# Patient Record
Sex: MALE | Race: WHITE | Employment: OTHER | URBAN - METROPOLITAN AREA
[De-identification: names, ages, dates, MRNs, and addresses within clinical notes are randomized per-mention and may not be internally consistent; named-entity substitution may affect disease eponyms.]

---

## 2017-06-28 DIAGNOSIS — M79.89 PAIN AND SWELLING OF TOE, RIGHT: ICD-10-CM

## 2017-06-28 DIAGNOSIS — M79.674 PAIN AND SWELLING OF TOE, RIGHT: ICD-10-CM

## 2017-06-28 LAB
ALBUMIN SERPL-MCNC: 4.4 G/DL (ref 3.9–4.9)
ALP BLD-CCNC: 80 U/L (ref 35–104)
ALT SERPL-CCNC: 29 U/L (ref 0–41)
ANION GAP SERPL CALCULATED.3IONS-SCNC: 15 MEQ/L (ref 7–13)
AST SERPL-CCNC: 28 U/L (ref 0–40)
BASOPHILS ABSOLUTE: 0 K/UL (ref 0–0.2)
BASOPHILS RELATIVE PERCENT: 0.6 %
BILIRUB SERPL-MCNC: 1 MG/DL (ref 0–1.2)
BUN BLDV-MCNC: 17 MG/DL (ref 8–23)
C-REACTIVE PROTEIN: 3.8 MG/L (ref 0–5)
CALCIUM SERPL-MCNC: 9.4 MG/DL (ref 8.6–10.2)
CHLORIDE BLD-SCNC: 98 MEQ/L (ref 98–107)
CO2: 23 MEQ/L (ref 22–29)
CREAT SERPL-MCNC: 0.95 MG/DL (ref 0.7–1.2)
EOSINOPHILS ABSOLUTE: 0.1 K/UL (ref 0–0.7)
EOSINOPHILS RELATIVE PERCENT: 1 %
GFR AFRICAN AMERICAN: >60
GFR NON-AFRICAN AMERICAN: >60
GLOBULIN: 2.6 G/DL (ref 2.3–3.5)
GLUCOSE BLD-MCNC: 80 MG/DL (ref 74–109)
HCT VFR BLD CALC: 45.7 % (ref 42–52)
HEMOGLOBIN: 15.4 G/DL (ref 14–18)
LYMPHOCYTES ABSOLUTE: 1.8 K/UL (ref 1–4.8)
LYMPHOCYTES RELATIVE PERCENT: 24.2 %
MCH RBC QN AUTO: 30 PG (ref 27–31.3)
MCHC RBC AUTO-ENTMCNC: 33.6 % (ref 33–37)
MCV RBC AUTO: 89.1 FL (ref 80–100)
MONOCYTES ABSOLUTE: 0.5 K/UL (ref 0.2–0.8)
MONOCYTES RELATIVE PERCENT: 6.6 %
NEUTROPHILS ABSOLUTE: 5.1 K/UL (ref 1.4–6.5)
NEUTROPHILS RELATIVE PERCENT: 67.6 %
PDW BLD-RTO: 13.8 % (ref 11.5–14.5)
PLATELET # BLD: 146 K/UL (ref 130–400)
POTASSIUM SERPL-SCNC: 4.1 MEQ/L (ref 3.5–5.1)
RBC # BLD: 5.13 M/UL (ref 4.7–6.1)
SEDIMENTATION RATE, ERYTHROCYTE: 7 MM (ref 0–20)
SODIUM BLD-SCNC: 136 MEQ/L (ref 132–144)
TOTAL PROTEIN: 7 G/DL (ref 6.4–8.1)
URIC ACID, SERUM: 7.4 MG/DL (ref 3.4–7)
WBC # BLD: 7.6 K/UL (ref 4.8–10.8)

## 2018-03-20 ENCOUNTER — OFFICE VISIT (OUTPATIENT)
Dept: PRIMARY CARE CLINIC | Age: 78
End: 2018-03-20
Payer: MEDICARE

## 2018-03-20 VITALS
RESPIRATION RATE: 18 BRPM | OXYGEN SATURATION: 95 % | WEIGHT: 267 LBS | SYSTOLIC BLOOD PRESSURE: 110 MMHG | DIASTOLIC BLOOD PRESSURE: 74 MMHG | TEMPERATURE: 96.6 F | HEIGHT: 73 IN | HEART RATE: 68 BPM | BODY MASS INDEX: 35.39 KG/M2

## 2018-03-20 DIAGNOSIS — M17.12 PRIMARY OSTEOARTHRITIS OF LEFT KNEE: ICD-10-CM

## 2018-03-20 DIAGNOSIS — Z00.00 ANNUAL PHYSICAL EXAM: Primary | ICD-10-CM

## 2018-03-20 DIAGNOSIS — Z12.11 SCREENING FOR COLON CANCER: ICD-10-CM

## 2018-03-20 DIAGNOSIS — Z00.00 ANNUAL PHYSICAL EXAM: ICD-10-CM

## 2018-03-20 DIAGNOSIS — Z12.5 PROSTATE CANCER SCREENING: ICD-10-CM

## 2018-03-20 DIAGNOSIS — I10 ESSENTIAL HYPERTENSION: ICD-10-CM

## 2018-03-20 DIAGNOSIS — K21.9 GASTROESOPHAGEAL REFLUX DISEASE, ESOPHAGITIS PRESENCE NOT SPECIFIED: ICD-10-CM

## 2018-03-20 DIAGNOSIS — Z23 NEED FOR PNEUMOCOCCAL VACCINATION: ICD-10-CM

## 2018-03-20 LAB
BASOPHILS ABSOLUTE: 0.1 K/UL (ref 0–0.2)
BASOPHILS RELATIVE PERCENT: 1.3 %
EOSINOPHILS ABSOLUTE: 0.2 K/UL (ref 0–0.7)
EOSINOPHILS RELATIVE PERCENT: 3.3 %
HCT VFR BLD CALC: 48.4 % (ref 42–52)
HEMOGLOBIN: 16.2 G/DL (ref 14–18)
LYMPHOCYTES ABSOLUTE: 1.6 K/UL (ref 1–4.8)
LYMPHOCYTES RELATIVE PERCENT: 32 %
MCH RBC QN AUTO: 30 PG (ref 27–31.3)
MCHC RBC AUTO-ENTMCNC: 33.4 % (ref 33–37)
MCV RBC AUTO: 89.7 FL (ref 80–100)
MONOCYTES ABSOLUTE: 0.4 K/UL (ref 0.2–0.8)
MONOCYTES RELATIVE PERCENT: 7.5 %
NEUTROPHILS ABSOLUTE: 2.8 K/UL (ref 1.4–6.5)
NEUTROPHILS RELATIVE PERCENT: 55.9 %
PDW BLD-RTO: 13.6 % (ref 11.5–14.5)
PLATELET # BLD: 137 K/UL (ref 130–400)
PROSTATE SPECIFIC ANTIGEN: 1.41 NG/ML (ref 0–6.22)
RBC # BLD: 5.4 M/UL (ref 4.7–6.1)
WBC # BLD: 5 K/UL (ref 4.8–10.8)

## 2018-03-20 PROCEDURE — G0009 ADMIN PNEUMOCOCCAL VACCINE: HCPCS | Performed by: FAMILY MEDICINE

## 2018-03-20 PROCEDURE — 90670 PCV13 VACCINE IM: CPT | Performed by: FAMILY MEDICINE

## 2018-03-20 PROCEDURE — G0439 PPPS, SUBSEQ VISIT: HCPCS | Performed by: FAMILY MEDICINE

## 2018-03-20 PROCEDURE — G8598 ASA/ANTIPLAT THER USED: HCPCS | Performed by: FAMILY MEDICINE

## 2018-03-20 RX ORDER — METOPROLOL SUCCINATE 25 MG/1
TABLET, EXTENDED RELEASE ORAL
COMMUNITY
Start: 2018-02-22

## 2018-03-20 RX ORDER — RANITIDINE 300 MG/1
300 TABLET ORAL NIGHTLY
Qty: 30 TABLET | Refills: 1 | Status: ON HOLD | OUTPATIENT
Start: 2018-03-20 | End: 2018-04-09

## 2018-03-20 ASSESSMENT — PATIENT HEALTH QUESTIONNAIRE - PHQ9
1. LITTLE INTEREST OR PLEASURE IN DOING THINGS: 0
SUM OF ALL RESPONSES TO PHQ QUESTIONS 1-9: 0
SUM OF ALL RESPONSES TO PHQ9 QUESTIONS 1 & 2: 0
2. FEELING DOWN, DEPRESSED OR HOPELESS: 0

## 2018-03-20 ASSESSMENT — ENCOUNTER SYMPTOMS
APNEA: 0
ABDOMINAL PAIN: 0
EYE DISCHARGE: 0
WHEEZING: 0
DIARRHEA: 0
CONSTIPATION: 0
PHOTOPHOBIA: 0
EYE REDNESS: 0
NAUSEA: 0
FACIAL SWELLING: 0
EYE PAIN: 0
STRIDOR: 0
COLOR CHANGE: 0
CHEST TIGHTNESS: 0
CHOKING: 0

## 2018-03-20 NOTE — PROGRESS NOTES
Subjective:      Patient ID: Diandra Steele is a 78 y.o. male who presents today for:  Chief Complaint   Patient presents with    Annual Exam     Patient is here for yearly physical. States he would like to talk to the Doctor about a few things.  Health Maintenance     Interested in FIT test and prevar 13       HPI     Annual Exam  Patient is here for an annual exam. He has labs done at [facility] in November. He does has some stomach pain due to acid reflux. He has spouts of GERD 3-4 a day and is currently taking Omeprazole with significant relief. He has an EGD in the past with Dr. Smith Milner. He does eat a generally healthy diet and exercising regularly. He denies drinking any ETOH. He is not interested in being on Omeprazole long term. He denies any chest pain, SOB, dizziness, headaches, or leg swelling. He is also concerned with weight gain after going through a grieving process with his brother passing. He feels as if he is depressed. He also has questing regarding getting his knees replaced and receiving a referral for a doctor. He is interested in getting a FIT test today in place of a colonoscopy and receiving and Prevnar 13. Past Medical History:   Diagnosis Date    CAD (coronary artery disease)     DJD (degenerative joint disease) of knee     LEFT KNEE     Hypercholesterolemia     Hypertension      Past Surgical History:   Procedure Laterality Date    CORONARY ANGIOPLASTY WITH STENT PLACEMENT      JOINT REPLACEMENT Bilateral     Knee    UPPER GASTROINTESTINAL ENDOSCOPY  1/29/16    w/bx,dilation      Family History   Problem Relation Age of Onset    COPD Mother     Lung Cancer Father     Other Brother      Unknown cause     Social History     Social History    Marital status:      Spouse name: N/A    Number of children: N/A    Years of education: N/A     Occupational History    Not on file.      Social History Main Topics    Smoking status: Former Smoker     Types: Cigarettes     Quit date: 1/1/1970    Smokeless tobacco: Never Used    Alcohol use No    Drug use: No    Sexual activity: Not on file     Other Topics Concern    Not on file     Social History Narrative    No narrative on file     Allergies:  Patient has no known allergies. Review of Systems   Constitutional: Negative for activity change, appetite change, chills, diaphoresis and fever. HENT: Negative for congestion, ear discharge, ear pain, facial swelling, hearing loss and mouth sores. Eyes: Negative for photophobia, pain, discharge and redness. Respiratory: Negative for apnea, choking, chest tightness, wheezing and stridor. Cardiovascular: Negative for chest pain, palpitations and leg swelling. Gastrointestinal: Negative for abdominal pain, constipation, diarrhea and nausea. Endocrine: Negative for cold intolerance, heat intolerance, polydipsia and polyphagia. Genitourinary: Negative for dysuria and frequency. Musculoskeletal: Negative for gait problem, joint swelling, neck pain and neck stiffness. Skin: Negative for color change, pallor, rash and wound. Allergic/Immunologic: Negative for immunocompromised state. Neurological: Negative for tremors, syncope, facial asymmetry, speech difficulty and headaches. Psychiatric/Behavioral: Negative for confusion and hallucinations. The patient is not nervous/anxious and is not hyperactive. Objective:   /74 (Site: Right Arm, Position: Sitting, Cuff Size: Large Adult)   Pulse 68   Temp 96.6 °F (35.9 °C) (Tympanic)   Resp 18   Ht 6' 1\" (1.854 m)   Wt 267 lb (121.1 kg)   SpO2 95%   BMI 35.23 kg/m²     Physical Exam   Constitutional: He is oriented to person, place, and time. He appears well-developed and well-nourished. HENT:   Head: Normocephalic and atraumatic. Eyes: Conjunctivae and EOM are normal. Pupils are equal, round, and reactive to light. Neck: Normal range of motion. Neck supple. No JVD present.  No

## 2018-03-21 ENCOUNTER — TELEPHONE (OUTPATIENT)
Dept: PRIMARY CARE CLINIC | Age: 78
End: 2018-03-21

## 2018-03-21 DIAGNOSIS — R19.5 POSITIVE FIT (FECAL IMMUNOCHEMICAL TEST): Primary | ICD-10-CM

## 2018-03-21 DIAGNOSIS — Z12.11 SCREENING FOR COLON CANCER: ICD-10-CM

## 2018-03-21 LAB
CONTROL: ABNORMAL
HEMOCCULT STL QL: ABNORMAL

## 2018-03-21 PROCEDURE — G0328 FECAL BLOOD SCRN IMMUNOASSAY: HCPCS | Performed by: FAMILY MEDICINE

## 2018-03-29 ENCOUNTER — TELEPHONE (OUTPATIENT)
Dept: ENDOSCOPY | Age: 78
End: 2018-03-29

## 2018-04-09 ENCOUNTER — HOSPITAL ENCOUNTER (OUTPATIENT)
Age: 78
Setting detail: OUTPATIENT SURGERY
Discharge: HOME | End: 2018-04-09
Attending: SPECIALIST | Admitting: SPECIALIST
Payer: MEDICARE

## 2018-04-09 ENCOUNTER — ANESTHESIA (OUTPATIENT)
Dept: ENDOSCOPY | Age: 78
End: 2018-04-09
Payer: MEDICARE

## 2018-04-09 ENCOUNTER — ANESTHESIA EVENT (OUTPATIENT)
Dept: ENDOSCOPY | Age: 78
End: 2018-04-09
Payer: MEDICARE

## 2018-04-09 VITALS
OXYGEN SATURATION: 96 % | SYSTOLIC BLOOD PRESSURE: 99 MMHG | DIASTOLIC BLOOD PRESSURE: 58 MMHG | RESPIRATION RATE: 15 BRPM

## 2018-04-09 VITALS
RESPIRATION RATE: 16 BRPM | TEMPERATURE: 97 F | HEART RATE: 55 BPM | SYSTOLIC BLOOD PRESSURE: 124 MMHG | WEIGHT: 260 LBS | DIASTOLIC BLOOD PRESSURE: 80 MMHG | OXYGEN SATURATION: 95 % | HEIGHT: 73 IN | BODY MASS INDEX: 34.46 KG/M2

## 2018-04-09 PROCEDURE — 7100000011 HC PHASE II RECOVERY - ADDTL 15 MIN: Performed by: SPECIALIST

## 2018-04-09 PROCEDURE — 7100000010 HC PHASE II RECOVERY - FIRST 15 MIN: Performed by: SPECIALIST

## 2018-04-09 PROCEDURE — 2500000003 HC RX 250 WO HCPCS: Performed by: SPECIALIST

## 2018-04-09 PROCEDURE — 6360000002 HC RX W HCPCS: Performed by: NURSE ANESTHETIST, CERTIFIED REGISTERED

## 2018-04-09 PROCEDURE — 3700000000 HC ANESTHESIA ATTENDED CARE: Performed by: SPECIALIST

## 2018-04-09 PROCEDURE — 3609027000 HC COLONOSCOPY: Performed by: SPECIALIST

## 2018-04-09 PROCEDURE — 3700000001 HC ADD 15 MINUTES (ANESTHESIA): Performed by: SPECIALIST

## 2018-04-09 RX ORDER — LIDOCAINE HYDROCHLORIDE 10 MG/ML
1 INJECTION, SOLUTION EPIDURAL; INFILTRATION; INTRACAUDAL; PERINEURAL
Status: COMPLETED | OUTPATIENT
Start: 2018-04-09 | End: 2018-04-09

## 2018-04-09 RX ORDER — SODIUM CHLORIDE 0.9 % (FLUSH) 0.9 %
10 SYRINGE (ML) INJECTION PRN
Status: DISCONTINUED | OUTPATIENT
Start: 2018-04-09 | End: 2018-04-09 | Stop reason: HOSPADM

## 2018-04-09 RX ORDER — SODIUM CHLORIDE 0.9 % (FLUSH) 0.9 %
10 SYRINGE (ML) INJECTION EVERY 12 HOURS SCHEDULED
Status: DISCONTINUED | OUTPATIENT
Start: 2018-04-09 | End: 2018-04-09 | Stop reason: HOSPADM

## 2018-04-09 RX ORDER — ONDANSETRON 2 MG/ML
4 INJECTION INTRAMUSCULAR; INTRAVENOUS
Status: DISCONTINUED | OUTPATIENT
Start: 2018-04-09 | End: 2018-04-09 | Stop reason: HOSPADM

## 2018-04-09 RX ORDER — PROPOFOL 10 MG/ML
INJECTION, EMULSION INTRAVENOUS PRN
Status: DISCONTINUED | OUTPATIENT
Start: 2018-04-09 | End: 2018-04-09 | Stop reason: SDUPTHER

## 2018-04-09 RX ORDER — SODIUM CHLORIDE 9 MG/ML
INJECTION, SOLUTION INTRAVENOUS CONTINUOUS
Status: DISCONTINUED | OUTPATIENT
Start: 2018-04-09 | End: 2018-04-09 | Stop reason: HOSPADM

## 2018-04-09 RX ADMIN — PROPOFOL 40 MG: 10 INJECTION, EMULSION INTRAVENOUS at 10:15

## 2018-04-09 RX ADMIN — LIDOCAINE HYDROCHLORIDE 30 MG: 10 INJECTION, SOLUTION EPIDURAL; INFILTRATION; INTRACAUDAL; PERINEURAL at 10:09

## 2018-04-09 RX ADMIN — PROPOFOL 200 MG: 10 INJECTION, EMULSION INTRAVENOUS at 10:09

## 2018-12-17 ENCOUNTER — TELEPHONE (OUTPATIENT)
Dept: PRIMARY CARE CLINIC | Age: 78
End: 2018-12-17

## 2018-12-17 NOTE — TELEPHONE ENCOUNTER
Madhavi Nunez from [facility] said they are unable to reach patient to set up home care.

## 2019-01-03 ENCOUNTER — OFFICE VISIT (OUTPATIENT)
Dept: PRIMARY CARE CLINIC | Age: 79
End: 2019-01-03
Payer: MEDICARE

## 2019-01-03 VITALS
TEMPERATURE: 97.4 F | DIASTOLIC BLOOD PRESSURE: 80 MMHG | OXYGEN SATURATION: 97 % | SYSTOLIC BLOOD PRESSURE: 122 MMHG | RESPIRATION RATE: 16 BRPM | WEIGHT: 251 LBS | HEART RATE: 72 BPM | BODY MASS INDEX: 33.27 KG/M2 | HEIGHT: 73 IN

## 2019-01-03 DIAGNOSIS — Z96.653 S/P BILATERAL UNICOMPARTMENTAL KNEE REPLACEMENT: ICD-10-CM

## 2019-01-03 DIAGNOSIS — I10 HTN (HYPERTENSION), BENIGN: Primary | ICD-10-CM

## 2019-01-03 DIAGNOSIS — R53.83 FATIGUE, UNSPECIFIED TYPE: ICD-10-CM

## 2019-01-03 DIAGNOSIS — Z09 POSTOP CHECK: ICD-10-CM

## 2019-01-03 DIAGNOSIS — D50.9 IRON DEFICIENCY ANEMIA, UNSPECIFIED IRON DEFICIENCY ANEMIA TYPE: ICD-10-CM

## 2019-01-03 LAB — HGB, POC: 12.5

## 2019-01-03 PROCEDURE — 85018 HEMOGLOBIN: CPT | Performed by: FAMILY MEDICINE

## 2019-01-03 PROCEDURE — G8427 DOCREV CUR MEDS BY ELIG CLIN: HCPCS | Performed by: FAMILY MEDICINE

## 2019-01-03 PROCEDURE — G8482 FLU IMMUNIZE ORDER/ADMIN: HCPCS | Performed by: FAMILY MEDICINE

## 2019-01-03 PROCEDURE — 99213 OFFICE O/P EST LOW 20 MIN: CPT | Performed by: FAMILY MEDICINE

## 2019-01-03 PROCEDURE — 1101F PT FALLS ASSESS-DOCD LE1/YR: CPT | Performed by: FAMILY MEDICINE

## 2019-01-03 PROCEDURE — 4040F PNEUMOC VAC/ADMIN/RCVD: CPT | Performed by: FAMILY MEDICINE

## 2019-01-03 PROCEDURE — 1123F ACP DISCUSS/DSCN MKR DOCD: CPT | Performed by: FAMILY MEDICINE

## 2019-01-03 PROCEDURE — G8598 ASA/ANTIPLAT THER USED: HCPCS | Performed by: FAMILY MEDICINE

## 2019-01-03 PROCEDURE — G8417 CALC BMI ABV UP PARAM F/U: HCPCS | Performed by: FAMILY MEDICINE

## 2019-01-03 PROCEDURE — 1036F TOBACCO NON-USER: CPT | Performed by: FAMILY MEDICINE

## 2019-01-03 ASSESSMENT — ENCOUNTER SYMPTOMS
APNEA: 0
CHEST TIGHTNESS: 0
SHORTNESS OF BREATH: 0
EYES NEGATIVE: 1
COUGH: 0
DIARRHEA: 0
RESPIRATORY NEGATIVE: 1
NAUSEA: 0
ABDOMINAL PAIN: 0
CONSTIPATION: 0
GASTROINTESTINAL NEGATIVE: 1
PHOTOPHOBIA: 0
VOMITING: 0
BACK PAIN: 0
BLOOD IN STOOL: 0
EYE DISCHARGE: 0

## 2019-01-23 ENCOUNTER — TELEPHONE (OUTPATIENT)
Dept: PRIMARY CARE CLINIC | Age: 79
End: 2019-01-23

## 2019-02-25 ENCOUNTER — TELEPHONE (OUTPATIENT)
Dept: PRIMARY CARE CLINIC | Age: 79
End: 2019-02-25

## 2019-02-27 ENCOUNTER — TELEPHONE (OUTPATIENT)
Dept: ENDOCRINOLOGY | Age: 79
End: 2019-02-27

## 2022-01-24 LAB
ABO: NORMAL
ANION GAP SERPL CALCULATED.3IONS-SCNC: 13 MMOL/L (ref 10–20)
ANTIBODY SCREEN: NORMAL
BICARBONATE: 27 MMOL/L (ref 21–32)
CALCIUM SERPL-MCNC: 9.5 MG/DL (ref 8.6–10.3)
CHLORIDE BLD-SCNC: 104 MMOL/L (ref 98–107)
CREAT SERPL-MCNC: 1.22 MG/DL (ref 0.5–1.3)
EGFR MALE: 60 ML/MIN/1.73M2
ERYTHROCYTE [DISTWIDTH] IN BLOOD BY AUTOMATED COUNT: 13.3 % (ref 11.5–14)
GLUCOSE: 122 MG/DL (ref 74–99)
HCT VFR BLD CALC: 48.7 % (ref 41–52)
HEMOGLOBIN: 15.7 G/DL (ref 13.5–17)
MCHC RBC AUTO-ENTMCNC: 32.2 G/DL (ref 32–36)
MCV RBC AUTO: 89 FL (ref 80–100)
NUCLEATED RBCS: 0 /100 WBC (ref 0–0)
PLATELET # BLD: 177 X10E9/L (ref 150–450)
POTASSIUM SERPL-SCNC: 4.6 MMOL/L (ref 3.5–5.3)
RBC # BLD: 5.46 X10E12/L (ref 4.5–5.9)
RH TYPE: NORMAL
SODIUM BLD-SCNC: 139 MMOL/L (ref 136–145)
UREA NITROGEN: 20 MG/DL (ref 6–23)
WBC: 6.5 X10E9/L (ref 4.4–11.3)

## 2022-01-31 LAB
SARS-COV-2, PCR: NOT DETECTED
SPECIMEN SOURCE: NORMAL

## 2022-02-03 LAB
ANION GAP SERPL CALCULATED.3IONS-SCNC: 13 MMOL/L (ref 10–20)
BASOPHILS # BLD: 0.02 X10E9/L (ref 0–0.1)
BASOPHILS RELATIVE PERCENT: 0.2 % (ref 0–2)
BICARBONATE: 23 MMOL/L (ref 21–32)
CALCIUM SERPL-MCNC: 8.3 MG/DL (ref 8.6–10.3)
CHLORIDE BLD-SCNC: 103 MMOL/L (ref 98–107)
CREAT SERPL-MCNC: 1.25 MG/DL (ref 0.5–1.3)
EGFR MALE: 58 ML/MIN/1.73M2
EOSINOPHIL # BLD: 0 X10E9/L (ref 0–0.4)
EOSINOPHILS RELATIVE PERCENT: 0 % (ref 0–6)
ERYTHROCYTE [DISTWIDTH] IN BLOOD BY AUTOMATED COUNT: 13.2 % (ref 11.5–14)
GLUCOSE: 146 MG/DL (ref 74–99)
HCT VFR BLD CALC: 36 % (ref 41–52)
HEMOGLOBIN: 11.5 G/DL (ref 13.5–17)
IMMATURE GRANULOCYTES %: 0.5 % (ref 0–0.9)
LYMPHOCYTES # BLD: 9.9 % (ref 13–44)
LYMPHOCYTES RELATIVE PERCENT: 1.13 X10E9/L (ref 0.8–3)
MCHC RBC AUTO-ENTMCNC: 31.9 G/DL (ref 32–36)
MCV RBC AUTO: 89 FL (ref 80–100)
MONOCYTES # BLD: 0.93 X10E9/L (ref 0.05–0.8)
MONOCYTES RELATIVE PERCENT: 8.2 % (ref 2–10)
NEUTROPHILS RELATIVE PERCENT: 81.2 % (ref 40–80)
NEUTROPHILS: 9.25 X10E9/L (ref 1.6–5.5)
NUCLEATED RBCS: 0 /100 WBC (ref 0–0)
PLATELET # BLD: 154 X10E9/L (ref 150–450)
POTASSIUM SERPL-SCNC: 4.5 MMOL/L (ref 3.5–5.3)
RBC # BLD: 4.06 X10E12/L (ref 4.5–5.9)
SODIUM BLD-SCNC: 134 MMOL/L (ref 136–145)
UREA NITROGEN: 24 MG/DL (ref 6–23)
WBC: 11.4 X10E9/L (ref 4.4–11.3)

## 2022-02-04 LAB
ANION GAP SERPL CALCULATED.3IONS-SCNC: 9 MMOL/L (ref 10–20)
BASOPHILS # BLD: 0.02 X10E9/L (ref 0–0.1)
BASOPHILS RELATIVE PERCENT: 0.3 % (ref 0–2)
BICARBONATE: 26 MMOL/L (ref 21–32)
CALCIUM SERPL-MCNC: 8.6 MG/DL (ref 8.6–10.3)
CHLORIDE BLD-SCNC: 108 MMOL/L (ref 98–107)
CREAT SERPL-MCNC: 1.12 MG/DL (ref 0.5–1.3)
EGFR MALE: 67 ML/MIN/1.73M2
EOSINOPHIL # BLD: 0.08 X10E9/L (ref 0–0.4)
EOSINOPHILS RELATIVE PERCENT: 1 % (ref 0–6)
ERYTHROCYTE [DISTWIDTH] IN BLOOD BY AUTOMATED COUNT: 13.4 % (ref 11.5–14)
GLUCOSE: 101 MG/DL (ref 74–99)
HCT VFR BLD CALC: 34.5 % (ref 41–52)
HEMOGLOBIN: 11.3 G/DL (ref 13.5–17)
IMMATURE GRANULOCYTES %: 0.5 % (ref 0–0.9)
LYMPHOCYTES # BLD: 25 % (ref 13–44)
LYMPHOCYTES RELATIVE PERCENT: 1.97 X10E9/L (ref 0.8–3)
MCHC RBC AUTO-ENTMCNC: 32.8 G/DL (ref 32–36)
MCV RBC AUTO: 90 FL (ref 80–100)
MONOCYTES # BLD: 0.76 X10E9/L (ref 0.05–0.8)
MONOCYTES RELATIVE PERCENT: 9.7 % (ref 2–10)
NEUTROPHILS RELATIVE PERCENT: 63.5 % (ref 40–80)
NEUTROPHILS: 5 X10E9/L (ref 1.6–5.5)
NUCLEATED RBCS: 0 /100 WBC (ref 0–0)
PLATELET # BLD: 133 X10E9/L (ref 150–450)
POTASSIUM SERPL-SCNC: 4.4 MMOL/L (ref 3.5–5.3)
RBC # BLD: 3.82 X10E12/L (ref 4.5–5.9)
SODIUM BLD-SCNC: 139 MMOL/L (ref 136–145)
UREA NITROGEN: 23 MG/DL (ref 6–23)
WBC: 7.9 X10E9/L (ref 4.4–11.3)

## 2023-04-19 LAB
ALANINE AMINOTRANSFERASE (SGPT) (U/L) IN SER/PLAS: 22 U/L (ref 10–52)
ALBUMIN (G/DL) IN SER/PLAS: 4.1 G/DL (ref 3.4–5)
ALKALINE PHOSPHATASE (U/L) IN SER/PLAS: 78 U/L (ref 33–136)
ANION GAP IN SER/PLAS: 13 MMOL/L (ref 10–20)
ASPARTATE AMINOTRANSFERASE (SGOT) (U/L) IN SER/PLAS: 23 U/L (ref 9–39)
BASOPHILS (10*3/UL) IN BLOOD BY AUTOMATED COUNT: 0.06 X10E9/L (ref 0–0.1)
BASOPHILS/100 LEUKOCYTES IN BLOOD BY AUTOMATED COUNT: 1.1 % (ref 0–2)
BILIRUBIN TOTAL (MG/DL) IN SER/PLAS: 1 MG/DL (ref 0–1.2)
CALCIUM (MG/DL) IN SER/PLAS: 9.7 MG/DL (ref 8.6–10.3)
CARBON DIOXIDE, TOTAL (MMOL/L) IN SER/PLAS: 24 MMOL/L (ref 21–32)
CHLORIDE (MMOL/L) IN SER/PLAS: 109 MMOL/L (ref 98–107)
CHOLESTEROL (MG/DL) IN SER/PLAS: 125 MG/DL (ref 0–199)
CHOLESTEROL IN HDL (MG/DL) IN SER/PLAS: 30.9 MG/DL
CHOLESTEROL/HDL RATIO: 4
CREATININE (MG/DL) IN SER/PLAS: 1.31 MG/DL (ref 0.5–1.3)
EOSINOPHILS (10*3/UL) IN BLOOD BY AUTOMATED COUNT: 0.16 X10E9/L (ref 0–0.4)
EOSINOPHILS/100 LEUKOCYTES IN BLOOD BY AUTOMATED COUNT: 3 % (ref 0–6)
ERYTHROCYTE DISTRIBUTION WIDTH (RATIO) BY AUTOMATED COUNT: 13.2 % (ref 11.5–14.5)
ERYTHROCYTE MEAN CORPUSCULAR HEMOGLOBIN CONCENTRATION (G/DL) BY AUTOMATED: 33.4 G/DL (ref 32–36)
ERYTHROCYTE MEAN CORPUSCULAR VOLUME (FL) BY AUTOMATED COUNT: 88 FL (ref 80–100)
ERYTHROCYTES (10*6/UL) IN BLOOD BY AUTOMATED COUNT: 5.05 X10E12/L (ref 4.5–5.9)
GFR MALE: 55 ML/MIN/1.73M2
GLUCOSE (MG/DL) IN SER/PLAS: 124 MG/DL (ref 74–99)
HEMATOCRIT (%) IN BLOOD BY AUTOMATED COUNT: 44.6 % (ref 41–52)
HEMOGLOBIN (G/DL) IN BLOOD: 14.9 G/DL (ref 13.5–17.5)
IMMATURE GRANULOCYTES/100 LEUKOCYTES IN BLOOD BY AUTOMATED COUNT: 0.2 % (ref 0–0.9)
LDL: 65 MG/DL (ref 0–99)
LEUKOCYTES (10*3/UL) IN BLOOD BY AUTOMATED COUNT: 5.4 X10E9/L (ref 4.4–11.3)
LYMPHOCYTES (10*3/UL) IN BLOOD BY AUTOMATED COUNT: 1.86 X10E9/L (ref 0.8–3)
LYMPHOCYTES/100 LEUKOCYTES IN BLOOD BY AUTOMATED COUNT: 34.8 % (ref 13–44)
MONOCYTES (10*3/UL) IN BLOOD BY AUTOMATED COUNT: 0.39 X10E9/L (ref 0.05–0.8)
MONOCYTES/100 LEUKOCYTES IN BLOOD BY AUTOMATED COUNT: 7.3 % (ref 2–10)
NATRIURETIC PEPTIDE B (PG/ML) IN SER/PLAS: 126 PG/ML (ref 0–99)
NEUTROPHILS (10*3/UL) IN BLOOD BY AUTOMATED COUNT: 2.87 X10E9/L (ref 1.6–5.5)
NEUTROPHILS/100 LEUKOCYTES IN BLOOD BY AUTOMATED COUNT: 53.6 % (ref 40–80)
PLATELETS (10*3/UL) IN BLOOD AUTOMATED COUNT: 167 X10E9/L (ref 150–450)
POTASSIUM (MMOL/L) IN SER/PLAS: 4.5 MMOL/L (ref 3.5–5.3)
PROTEIN TOTAL: 6.7 G/DL (ref 6.4–8.2)
SODIUM (MMOL/L) IN SER/PLAS: 141 MMOL/L (ref 136–145)
TRIGLYCERIDE (MG/DL) IN SER/PLAS: 146 MG/DL (ref 0–149)
UREA NITROGEN (MG/DL) IN SER/PLAS: 23 MG/DL (ref 6–23)
VLDL: 29 MG/DL (ref 0–40)

## 2023-12-11 ENCOUNTER — CLINICAL SUPPORT (OUTPATIENT)
Dept: PRIMARY CARE | Facility: CLINIC | Age: 83
End: 2023-12-11
Payer: MEDICARE

## 2023-12-11 DIAGNOSIS — R05.9 COUGH, UNSPECIFIED TYPE: Primary | ICD-10-CM

## 2023-12-11 PROCEDURE — 87636 SARSCOV2 & INF A&B AMP PRB: CPT

## 2023-12-11 PROCEDURE — 87634 RSV DNA/RNA AMP PROBE: CPT

## 2023-12-12 LAB
FLUAV RNA RESP QL NAA+PROBE: NOT DETECTED
FLUBV RNA RESP QL NAA+PROBE: NOT DETECTED
RSV RNA RESP QL NAA+PROBE: DETECTED
SARS-COV-2 RNA RESP QL NAA+PROBE: NOT DETECTED

## 2024-01-18 ENCOUNTER — OFFICE VISIT (OUTPATIENT)
Dept: CARDIOLOGY | Facility: CLINIC | Age: 84
End: 2024-01-18
Payer: MEDICARE

## 2024-01-18 VITALS
WEIGHT: 267 LBS | DIASTOLIC BLOOD PRESSURE: 80 MMHG | SYSTOLIC BLOOD PRESSURE: 118 MMHG | TEMPERATURE: 97.1 F | HEART RATE: 65 BPM | BODY MASS INDEX: 35.39 KG/M2 | HEIGHT: 73 IN

## 2024-01-18 DIAGNOSIS — I10 ESSENTIAL HYPERTENSION, BENIGN: ICD-10-CM

## 2024-01-18 DIAGNOSIS — I25.10 ATHEROSCLEROSIS OF NATIVE CORONARY ARTERY OF NATIVE HEART WITHOUT ANGINA PECTORIS: Primary | ICD-10-CM

## 2024-01-18 DIAGNOSIS — Z87.891 FORMER SMOKER: ICD-10-CM

## 2024-01-18 DIAGNOSIS — E78.00 HYPERCHOLESTEROLEMIA: ICD-10-CM

## 2024-01-18 DIAGNOSIS — I45.10 RIGHT BUNDLE BRANCH BLOCK: ICD-10-CM

## 2024-01-18 DIAGNOSIS — I25.5 ISCHEMIC CARDIOMYOPATHY: ICD-10-CM

## 2024-01-18 PROBLEM — E78.5 HYPERLIPIDEMIA: Status: ACTIVE | Noted: 2018-11-26

## 2024-01-18 PROBLEM — E11.9 DIABETES MELLITUS (MULTI): Status: ACTIVE | Noted: 2024-01-18

## 2024-01-18 PROCEDURE — 93000 ELECTROCARDIOGRAM COMPLETE: CPT | Performed by: INTERNAL MEDICINE

## 2024-01-18 PROCEDURE — 1159F MED LIST DOCD IN RCRD: CPT | Performed by: INTERNAL MEDICINE

## 2024-01-18 PROCEDURE — 3074F SYST BP LT 130 MM HG: CPT | Performed by: INTERNAL MEDICINE

## 2024-01-18 PROCEDURE — 1036F TOBACCO NON-USER: CPT | Performed by: INTERNAL MEDICINE

## 2024-01-18 PROCEDURE — 3079F DIAST BP 80-89 MM HG: CPT | Performed by: INTERNAL MEDICINE

## 2024-01-18 PROCEDURE — 99214 OFFICE O/P EST MOD 30 MIN: CPT | Performed by: INTERNAL MEDICINE

## 2024-01-18 RX ORDER — RAMIPRIL 2.5 MG/1
2.5 CAPSULE ORAL DAILY
COMMUNITY
Start: 2021-11-08 | End: 2024-04-16

## 2024-01-18 RX ORDER — CLOPIDOGREL BISULFATE 75 MG/1
TABLET ORAL
COMMUNITY
Start: 2020-08-10 | End: 2024-05-29 | Stop reason: SDUPTHER

## 2024-01-18 RX ORDER — ASPIRIN 81 MG/1
81 TABLET ORAL DAILY
COMMUNITY

## 2024-01-18 RX ORDER — ATORVASTATIN CALCIUM 80 MG/1
80 TABLET, FILM COATED ORAL DAILY
COMMUNITY
Start: 2016-02-10

## 2024-01-18 RX ORDER — METOPROLOL SUCCINATE 25 MG/1
25 TABLET, EXTENDED RELEASE ORAL DAILY
COMMUNITY
Start: 2018-02-22

## 2024-01-18 RX ORDER — NITROGLYCERIN 0.4 MG/1
0.4 TABLET SUBLINGUAL EVERY 5 MIN PRN
COMMUNITY
Start: 2016-02-10

## 2024-01-18 ASSESSMENT — ENCOUNTER SYMPTOMS
EYES NEGATIVE: 1
PSYCHIATRIC NEGATIVE: 1
DYSURIA: 0
HEMATOLOGIC/LYMPHATIC NEGATIVE: 1
ARTHRALGIAS: 1
RESPIRATORY NEGATIVE: 1
CARDIOVASCULAR NEGATIVE: 1
NEUROLOGICAL NEGATIVE: 1
CONSTITUTIONAL NEGATIVE: 1
GASTROINTESTINAL NEGATIVE: 1
HEMATURIA: 0
DIFFICULTY URINATING: 1

## 2024-01-18 ASSESSMENT — PATIENT HEALTH QUESTIONNAIRE - PHQ9
1. LITTLE INTEREST OR PLEASURE IN DOING THINGS: NOT AT ALL
2. FEELING DOWN, DEPRESSED OR HOPELESS: NOT AT ALL
SUM OF ALL RESPONSES TO PHQ9 QUESTIONS 1 AND 2: 0

## 2024-01-18 NOTE — PROGRESS NOTES
Patient:  Michael Light  YOB: 1940  MRN: [id number]       Chief Complaint/Active Symptoms:       Michael Light is a 83 y.o. male who returns today for cardiac follow-up.    Patient doing well. Wife  fell off a picnic bench and had fractured pelvis then developed GI bleed requiring intervention. No chest pain or discomfort, no shortness of breath. No palpitations, dizziness or lightheadedness.  Remains active and has no specific complaints other than some arthritic ones.    Wife has been ill and finally recovering at home. Has been a stressor for the patient.     Review of Systems   Constitutional: Negative.    HENT:  Positive for hearing loss.    Eyes: Negative.    Respiratory: Negative.     Cardiovascular: Negative.    Gastrointestinal: Negative.    Genitourinary:  Positive for difficulty urinating. Negative for dysuria and hematuria.   Musculoskeletal:  Positive for arthralgias.   Neurological: Negative.    Hematological: Negative.    Psychiatric/Behavioral: Negative.     All other systems reviewed and are negative.      Objective:     Vitals:    01/18/24 0931   BP: 118/80   Pulse: 65   Temp: 36.2 °C (97.1 °F)       Vitals:    01/18/24 0931   Weight: 121 kg (267 lb)       Allergies:     No Known Allergies       Medications:     Current Outpatient Medications   Medication Instructions    aspirin 81 mg, oral, Daily    atorvastatin (LIPITOR) 80 mg, oral, Daily    clopidogrel (Plavix) 75 mg tablet     fish oil concentrate (Omega-3) 120-180 mg capsule oral    metoprolol succinate XL (TOPROL-XL) 25 mg, oral, Daily    nitroglycerin (NITROSTAT) 0.4 mg, sublingual, Every 5 min PRN    ramipril (ALTACE) 2.5 mg, oral, Daily       Physical Examination:   GENERAL:  Well developed, well nourished, in no acute distress.  HEENT: NC AT, EOMI with anicteric sclera  NECK:  Supple, no JVD, no bruit.  CHEST:  Symmetric and nontender.  LUNGS:  Clear to auscultation bilaterally, normal respiratory effort.  HEART:   "PMI nonpalpable, RR, normal S1 and S2, no S3. Soft EVE RUSB, trace edema.   ABDOMEN: Soft, NT, ND without palpable organomegaly or bruits  EXTREMITIES:  Warm with good color, no clubbing or cyanosis.  There is no edema noted.  PERIPHERAL VASCULAR:  Pulses present and equally palpable;  MUSCULOSKELETAL: OA changes  NEURO/PSYCH:  Alert and oriented times three with approppriate behavior and responses. Nonfocal motor examination with normal gait and ambulation  Lymph: No significant palpable lymphadenopathy  Skin: no rash or lesions on exposed skin or reported.    Lab:     CBC:   Lab Results   Component Value Date    WBC 5.4 04/19/2023    RBC 5.05 04/19/2023    HGB 14.9 04/19/2023    HCT 44.6 04/19/2023     04/19/2023        CMP:    Lab Results   Component Value Date     04/19/2023    K 4.5 04/19/2023     (H) 04/19/2023    CO2 24 04/19/2023    BUN 23 04/19/2023    CREATININE 1.31 (H) 04/19/2023    GLUCOSE 124 (H) 04/19/2023    CALCIUM 9.7 04/19/2023       Magnesium:    Lab Results   Component Value Date    MG 2.00 08/11/2021       Lipid Profile:    Lab Results   Component Value Date    TRIG 146 04/19/2023    HDL 30.9 (A) 04/19/2023       TSH:    Lab Results   Component Value Date    TSH 1.29 08/11/2021       BNP:   Lab Results   Component Value Date     (H) 04/19/2023        PT/INR:    No results found for: \"PROTIME\", \"INR\"    HgBA1c:    Lab Results   Component Value Date    HGBA1C 6.1 08/11/2021       BMP:  Lab Results   Component Value Date     04/19/2023     02/04/2022     (L) 02/03/2022    K 4.5 04/19/2023    K 4.4 02/04/2022    K 4.5 02/03/2022     (H) 04/19/2023     (H) 02/04/2022     02/03/2022    CO2 24 04/19/2023    CO2 26 02/04/2022    CO2 23 02/03/2022    BUN 23 04/19/2023    BUN 23 02/04/2022    BUN 24 (H) 02/03/2022    CREATININE 1.31 (H) 04/19/2023    CREATININE 1.12 02/04/2022    CREATININE 1.25 02/03/2022       Cardiac Enzymes:    No results " "found for: \"TROPHS\"    Hepatic Function Panel:    Lab Results   Component Value Date    ALKPHOS 78 04/19/2023    ALT 22 04/19/2023    AST 23 04/19/2023    PROT 6.7 04/19/2023    BILITOT 1.0 04/19/2023    BILIDIR 0.2 10/13/2022         Diagnostic Studies:     No echocardiogram results found for the past 12 months  Echocardiogram September 2021 showed an EF of 55% and mild left ventricular hypertrophy with impaired relaxation.  No significant valvular abnormalities  No nuclear medicine results found for the past 12 months  Nuclear stress test in August 2020 showed a moderate inferior myocardial infarction without ischemia and EF of 51%.  No valid procedures specified.    EKG:   No results found for: \"EKG\"  EKG SR with first degree av block, RBBB, criteria for inferolateral MI.  Radiology:     No orders to display         ASSESSMENT     Problem List Items Addressed This Visit       Atherosclerosis of native coronary artery of native heart without angina pectoris - Primary    Relevant Medications    clopidogrel (Plavix) 75 mg tablet    metoprolol succinate XL (Toprol-XL) 25 mg 24 hr tablet    nitroglycerin (Nitrostat) 0.4 mg SL tablet    Other Relevant Orders    Comprehensive Metabolic Panel    CBC    Lipid Panel    Essential hypertension, benign    Relevant Orders    ECG 12 lead (Clinic Performed) (Completed)    Comprehensive Metabolic Panel    Hypercholesterolemia    Relevant Orders    Comprehensive Metabolic Panel    Lipid Panel    Ischemic cardiomyopathy    Relevant Medications    clopidogrel (Plavix) 75 mg tablet    metoprolol succinate XL (Toprol-XL) 25 mg 24 hr tablet    nitroglycerin (Nitrostat) 0.4 mg SL tablet    Other Relevant Orders    Comprehensive Metabolic Panel    Right bundle branch block    Former smoker    BMI 35.0-35.9,adult       PLAN     1.  Coronary artery disease with history of prior myocardial infarction stable without angina pectoris.  Continue conservative medical therapy and risk factor " modification.  2.  Benign essential hypertension.  Blood pressures are well-controlled on his current medical regimen.  3.  Ischemic cardiomyopathy.  Patient has a history of mild LV dysfunction but most recent evaluation shows low normal EF.  No clinical signs of heart failure and would follow with good blood pressure control.  He is already on both ACE inhibitor and beta-blockers.  4.  Hypercholesterolemia.  Cholesterol is under being checked regularly last shows his LDL cholesterol was at goal.  He will continue his high intensity statin therapy.  5.  Right bundle branch block.  Finding on his EKG.  6.  Tobacco and weight status.  The patient is a former smoker who remains tobacco free is moderately obese we have encouraged a heart healthy Mediterranean diet.    As long as the patient continues to do well we will see him in follow-up in 6 to 9 months sooner if there is any questions or concerns.

## 2024-02-08 ENCOUNTER — APPOINTMENT (OUTPATIENT)
Dept: PRIMARY CARE | Facility: CLINIC | Age: 84
End: 2024-02-08
Payer: MEDICARE

## 2024-02-19 ENCOUNTER — OFFICE VISIT (OUTPATIENT)
Dept: PRIMARY CARE | Facility: CLINIC | Age: 84
End: 2024-02-19
Payer: MEDICARE

## 2024-02-19 VITALS
HEART RATE: 65 BPM | DIASTOLIC BLOOD PRESSURE: 60 MMHG | TEMPERATURE: 97.2 F | BODY MASS INDEX: 35.52 KG/M2 | SYSTOLIC BLOOD PRESSURE: 104 MMHG | RESPIRATION RATE: 16 BRPM | OXYGEN SATURATION: 94 % | WEIGHT: 268 LBS | HEIGHT: 73 IN

## 2024-02-19 DIAGNOSIS — E11.9 TYPE 2 DIABETES MELLITUS WITHOUT COMPLICATION, WITHOUT LONG-TERM CURRENT USE OF INSULIN (MULTI): ICD-10-CM

## 2024-02-19 DIAGNOSIS — K21.9 GASTROESOPHAGEAL REFLUX DISEASE WITHOUT ESOPHAGITIS: ICD-10-CM

## 2024-02-19 DIAGNOSIS — E66.01 MORBID OBESITY (MULTI): ICD-10-CM

## 2024-02-19 DIAGNOSIS — Z12.5 PROSTATE CANCER SCREENING: ICD-10-CM

## 2024-02-19 DIAGNOSIS — Z00.00 MEDICARE ANNUAL WELLNESS VISIT, SUBSEQUENT: Primary | ICD-10-CM

## 2024-02-19 DIAGNOSIS — N18.32 STAGE 3B CHRONIC KIDNEY DISEASE (MULTI): ICD-10-CM

## 2024-02-19 DIAGNOSIS — E78.5 HYPERLIPIDEMIA, UNSPECIFIED HYPERLIPIDEMIA TYPE: ICD-10-CM

## 2024-02-19 DIAGNOSIS — M25.50 ARTHRALGIA, UNSPECIFIED JOINT: ICD-10-CM

## 2024-02-19 DIAGNOSIS — I25.10 ATHEROSCLEROSIS OF NATIVE CORONARY ARTERY OF NATIVE HEART WITHOUT ANGINA PECTORIS: ICD-10-CM

## 2024-02-19 DIAGNOSIS — B35.6 JOCK ITCH: ICD-10-CM

## 2024-02-19 DIAGNOSIS — I10 ESSENTIAL HYPERTENSION, BENIGN: ICD-10-CM

## 2024-02-19 DIAGNOSIS — I25.5 ISCHEMIC CARDIOMYOPATHY: ICD-10-CM

## 2024-02-19 PROCEDURE — 99497 ADVNCD CARE PLAN 30 MIN: CPT | Performed by: FAMILY MEDICINE

## 2024-02-19 PROCEDURE — G0439 PPPS, SUBSEQ VISIT: HCPCS | Performed by: FAMILY MEDICINE

## 2024-02-19 RX ORDER — OMEPRAZOLE 20 MG/1
20 TABLET, DELAYED RELEASE ORAL DAILY
Qty: 90 TABLET | Refills: 1 | COMMUNITY
Start: 2024-02-19 | End: 2024-08-17

## 2024-02-19 RX ORDER — CLOTRIMAZOLE AND BETAMETHASONE DIPROPIONATE 10; .64 MG/G; MG/G
1 CREAM TOPICAL 2 TIMES DAILY
Qty: 100 G | Refills: 1 | Status: SHIPPED | OUTPATIENT
Start: 2024-02-19 | End: 2024-04-19

## 2024-02-19 ASSESSMENT — ENCOUNTER SYMPTOMS
MYALGIAS: 0
EYE PAIN: 0
RECTAL PAIN: 0
APPETITE CHANGE: 0
SLEEP DISTURBANCE: 0
DIARRHEA: 0
ACTIVITY CHANGE: 0
HEADACHES: 0
CHEST TIGHTNESS: 0
STRIDOR: 0
ARTHRALGIAS: 0
FATIGUE: 0
CONSTITUTIONAL NEGATIVE: 1
HEMATURIA: 0
POLYDIPSIA: 0
SHORTNESS OF BREATH: 0
PALPITATIONS: 0
DEPRESSION: 0
OCCASIONAL FEELINGS OF UNSTEADINESS: 0
NERVOUS/ANXIOUS: 0
NECK STIFFNESS: 0
SPEECH DIFFICULTY: 0
COLOR CHANGE: 0
BLOOD IN STOOL: 0
CONSTIPATION: 0
FLANK PAIN: 0
ADENOPATHY: 0
SEIZURES: 0
ABDOMINAL DISTENTION: 0
COUGH: 0
ABDOMINAL PAIN: 0
SORE THROAT: 0
DYSPHORIC MOOD: 0
AGITATION: 0
LOSS OF SENSATION IN FEET: 0
TROUBLE SWALLOWING: 0
DIZZINESS: 0
POLYPHAGIA: 0
FEVER: 0
DECREASED CONCENTRATION: 0
SINUS PRESSURE: 0
RHINORRHEA: 0
SINUS PAIN: 0
DYSURIA: 0
CONFUSION: 0
PHOTOPHOBIA: 0

## 2024-02-19 ASSESSMENT — ACTIVITIES OF DAILY LIVING (ADL)
DRESSING: INDEPENDENT
GROCERY_SHOPPING: INDEPENDENT
MANAGING_FINANCES: INDEPENDENT
DOING_HOUSEWORK: INDEPENDENT
TAKING_MEDICATION: INDEPENDENT
BATHING: INDEPENDENT

## 2024-02-19 ASSESSMENT — PATIENT HEALTH QUESTIONNAIRE - PHQ9
SUM OF ALL RESPONSES TO PHQ9 QUESTIONS 1 AND 2: 0
1. LITTLE INTEREST OR PLEASURE IN DOING THINGS: NOT AT ALL
2. FEELING DOWN, DEPRESSED OR HOPELESS: NOT AT ALL

## 2024-02-19 NOTE — PROGRESS NOTES
"Subjective   Reason for Visit: Michael Light is an 83 y.o. male here for a Medicare Wellness visit.     Past Medical, Surgical, and Family History reviewed and updated in chart.         HPI    Patient Care Team:  Gokul Sahu DO as PCP - General     Review of Systems   Constitutional: Negative.  Negative for activity change, appetite change, fatigue and fever.   HENT:  Negative for congestion, dental problem, ear discharge, ear pain, mouth sores, rhinorrhea, sinus pressure, sinus pain, sore throat, tinnitus and trouble swallowing.    Eyes:  Negative for photophobia, pain and visual disturbance.   Respiratory:  Negative for cough, chest tightness, shortness of breath and stridor.    Cardiovascular:  Negative for chest pain and palpitations.   Gastrointestinal:  Negative for abdominal distention, abdominal pain, blood in stool, constipation, diarrhea and rectal pain.   Endocrine: Negative for cold intolerance, heat intolerance, polydipsia, polyphagia and polyuria.   Genitourinary:  Negative for dysuria, flank pain, hematuria and urgency.   Musculoskeletal:  Negative for arthralgias, gait problem, myalgias and neck stiffness.   Skin:  Negative for color change and rash.   Allergic/Immunologic: Negative for environmental allergies and food allergies.   Neurological:  Negative for dizziness, seizures, syncope, speech difficulty and headaches.   Hematological:  Negative for adenopathy.   Psychiatric/Behavioral:  Negative for agitation, confusion, decreased concentration, dysphoric mood and sleep disturbance. The patient is not nervous/anxious.      Objective   Vitals:  /60   Pulse 65   Temp 36.2 °C (97.2 °F)   Resp 16   Ht 1.854 m (6' 1\")   Wt 122 kg (268 lb)   SpO2 94%   BMI 35.36 kg/m²       Physical Exam  Vitals reviewed.   Constitutional:       General: He is not in acute distress.     Appearance: Normal appearance. He is normal weight. He is not ill-appearing or diaphoretic.   HENT:      Head: " Normocephalic.      Right Ear: Tympanic membrane and external ear normal.      Left Ear: Tympanic membrane and external ear normal.      Nose: Nose normal. No congestion.      Mouth/Throat:      Pharynx: No posterior oropharyngeal erythema.   Eyes:      General:         Right eye: No discharge.         Left eye: No discharge.      Extraocular Movements: Extraocular movements intact.      Conjunctiva/sclera: Conjunctivae normal.      Pupils: Pupils are equal, round, and reactive to light.   Cardiovascular:      Rate and Rhythm: Normal rate and regular rhythm.      Pulses: Normal pulses.      Heart sounds: Normal heart sounds. No murmur heard.  Pulmonary:      Effort: Pulmonary effort is normal. No respiratory distress.      Breath sounds: Normal breath sounds. No wheezing or rales.   Chest:      Chest wall: No tenderness.   Abdominal:      General: Abdomen is flat. Bowel sounds are normal. There is no distension.      Palpations: There is no mass.      Tenderness: There is no abdominal tenderness. There is no guarding.   Musculoskeletal:         General: No tenderness. Normal range of motion.      Cervical back: Normal range of motion and neck supple. No tenderness.      Right lower leg: No edema.      Left lower leg: No edema.   Skin:     General: Skin is dry.      Coloration: Skin is not jaundiced.      Findings: No bruising, erythema or rash.   Neurological:      General: No focal deficit present.      Mental Status: He is alert and oriented to person, place, and time. Mental status is at baseline.      Cranial Nerves: No cranial nerve deficit.      Sensory: No sensory deficit.      Coordination: Coordination normal.      Gait: Gait normal.   Psychiatric:         Mood and Affect: Mood normal.         Thought Content: Thought content normal.         Judgment: Judgment normal.       Assessment/Plan   Problem List Items Addressed This Visit       Atherosclerosis of native coronary artery of native heart without  angina pectoris    Essential hypertension, benign    Relevant Orders    CBC and Auto Differential (Completed)    Comprehensive Metabolic Panel    Lipid Panel    Diabetes mellitus (CMS/[facility])    Hyperlipidemia    Relevant Orders    CBC and Auto Differential (Completed)    Comprehensive Metabolic Panel    Lipid Panel    Ischemic cardiomyopathy    BMI 35.0-35.9,adult    Stage 3b chronic kidney disease (CMS/[facility])    Current Assessment & Plan     Stable/monitored          Other Visit Diagnoses       Medicare annual wellness visit, subsequent    -  Primary    Relevant Orders    Prostate Specific Antigen, Screen (Completed)    CBC and Auto Differential (Completed)    Comprehensive Metabolic Panel    Lipid Panel    Gastroesophageal reflux disease without esophagitis        Relevant Medications    omeprazole OTC (PriLOSEC OTC) 20 mg EC tablet    Jock itch        Relevant Medications    clotrimazole-betamethasone (Lotrisone) cream    Prostate cancer screening        Relevant Orders    Prostate Specific Antigen, Screen (Completed)    Arthralgia, unspecified joint        Relevant Orders    Sedimentation Rate (Completed)    Morbid obesity (CMS/[facility])              Scribe Attestation  By signing my name below, ISharmila RMA, Scribe   attest that this documentation has been prepared under the direction and in the presence of Gokul Sahu DO.     Provider Attestation - Scribe documentation    All medical record entries made by the Scribe were at my direction and personally dictated by me. I have reviewed the chart and agree that the record accurately reflects my personal performance of the history, physical exam, discussion and plan.

## 2024-02-19 NOTE — PROGRESS NOTES
Subjective   Patient ID: Michael Light is a 83 y.o. male who presents for Medicare Annual Wellness Visit Subsequent.    Patient has no concerns     HPI     Review of Systems    Objective   There were no vitals taken for this visit.    Physical Exam    Assessment/Plan   {Assess/PlanSmartLinks:[id number]}

## 2024-02-19 NOTE — PATIENT INSTRUCTIONS
continue all current medications and therapy for chronic medical conditions     Fasting blood work ordered.     Follow up in 6 months    Continue current medications and therapy for chronic medical conditions.    Patient was advised importance of proper diet/nutrition in addition adequate hydration. Patient was encouraged moderate exercise program to include 30 minutes daily for 5 days of the week or 150 minutes weekly. Patient will follow-up with us as scheduled.    I personally reviewed the OARRS report for this patient. I have considered the risks of abuse, dependence, addiction, and diversion.    UDS/CSA:    Obtain laboratory to include CMP, lipid profile, PSA, sedimentation rate and CBC    Start Lotrisone 1% cream twice daily application    Start Prilosec 20 mg daily    Complete Medicare annual wellness physical/exam    Counseled on advance directives

## 2024-02-21 ENCOUNTER — LAB (OUTPATIENT)
Dept: LAB | Facility: LAB | Age: 84
End: 2024-02-21
Payer: MEDICARE

## 2024-02-21 DIAGNOSIS — I10 ESSENTIAL HYPERTENSION, BENIGN: ICD-10-CM

## 2024-02-21 DIAGNOSIS — E78.5 HYPERLIPIDEMIA, UNSPECIFIED HYPERLIPIDEMIA TYPE: ICD-10-CM

## 2024-02-21 DIAGNOSIS — Z00.00 MEDICARE ANNUAL WELLNESS VISIT, SUBSEQUENT: ICD-10-CM

## 2024-02-21 DIAGNOSIS — I25.10 ATHEROSCLEROSIS OF NATIVE CORONARY ARTERY OF NATIVE HEART WITHOUT ANGINA PECTORIS: ICD-10-CM

## 2024-02-21 DIAGNOSIS — Z12.5 PROSTATE CANCER SCREENING: ICD-10-CM

## 2024-02-21 DIAGNOSIS — E78.00 HYPERCHOLESTEROLEMIA: ICD-10-CM

## 2024-02-21 DIAGNOSIS — I25.5 ISCHEMIC CARDIOMYOPATHY: ICD-10-CM

## 2024-02-21 DIAGNOSIS — M25.50 ARTHRALGIA, UNSPECIFIED JOINT: ICD-10-CM

## 2024-02-21 LAB
ALBUMIN SERPL BCP-MCNC: 4.1 G/DL (ref 3.4–5)
ALP SERPL-CCNC: 96 U/L (ref 33–136)
ALT SERPL W P-5'-P-CCNC: 19 U/L (ref 10–52)
ANION GAP SERPL CALC-SCNC: 13 MMOL/L (ref 10–20)
AST SERPL W P-5'-P-CCNC: 19 U/L (ref 9–39)
BASOPHILS # BLD AUTO: 0.06 X10*3/UL (ref 0–0.1)
BASOPHILS NFR BLD AUTO: 1 %
BILIRUB SERPL-MCNC: 0.8 MG/DL (ref 0–1.2)
BUN SERPL-MCNC: 25 MG/DL (ref 6–23)
CALCIUM SERPL-MCNC: 9.3 MG/DL (ref 8.6–10.3)
CHLORIDE SERPL-SCNC: 107 MMOL/L (ref 98–107)
CHOLEST SERPL-MCNC: 133 MG/DL (ref 0–199)
CHOLESTEROL/HDL RATIO: 4
CO2 SERPL-SCNC: 26 MMOL/L (ref 21–32)
CREAT SERPL-MCNC: 1.38 MG/DL (ref 0.5–1.3)
EGFRCR SERPLBLD CKD-EPI 2021: 51 ML/MIN/1.73M*2
EOSINOPHIL # BLD AUTO: 0.27 X10*3/UL (ref 0–0.4)
EOSINOPHIL NFR BLD AUTO: 4.4 %
ERYTHROCYTE [DISTWIDTH] IN BLOOD BY AUTOMATED COUNT: 13.2 % (ref 11.5–14.5)
ERYTHROCYTE [SEDIMENTATION RATE] IN BLOOD BY WESTERGREN METHOD: 15 MM/H (ref 0–20)
GLUCOSE SERPL-MCNC: 127 MG/DL (ref 74–99)
HCT VFR BLD AUTO: 44.7 % (ref 41–52)
HDLC SERPL-MCNC: 33.2 MG/DL
HGB BLD-MCNC: 14.8 G/DL (ref 13.5–17.5)
IMM GRANULOCYTES # BLD AUTO: 0.01 X10*3/UL (ref 0–0.5)
IMM GRANULOCYTES NFR BLD AUTO: 0.2 % (ref 0–0.9)
LDLC SERPL CALC-MCNC: 69 MG/DL
LYMPHOCYTES # BLD AUTO: 2.13 X10*3/UL (ref 0.8–3)
LYMPHOCYTES NFR BLD AUTO: 34.4 %
MCH RBC QN AUTO: 29.1 PG (ref 26–34)
MCHC RBC AUTO-ENTMCNC: 33.1 G/DL (ref 32–36)
MCV RBC AUTO: 88 FL (ref 80–100)
MONOCYTES # BLD AUTO: 0.4 X10*3/UL (ref 0.05–0.8)
MONOCYTES NFR BLD AUTO: 6.5 %
NEUTROPHILS # BLD AUTO: 3.32 X10*3/UL (ref 1.6–5.5)
NEUTROPHILS NFR BLD AUTO: 53.5 %
NON HDL CHOLESTEROL: 100 MG/DL (ref 0–149)
NRBC BLD-RTO: 0 /100 WBCS (ref 0–0)
PLATELET # BLD AUTO: 157 X10*3/UL (ref 150–450)
POTASSIUM SERPL-SCNC: 4.7 MMOL/L (ref 3.5–5.3)
PROT SERPL-MCNC: 6.5 G/DL (ref 6.4–8.2)
PSA SERPL-MCNC: 1.67 NG/ML
RBC # BLD AUTO: 5.09 X10*6/UL (ref 4.5–5.9)
SODIUM SERPL-SCNC: 141 MMOL/L (ref 136–145)
TRIGL SERPL-MCNC: 155 MG/DL (ref 0–149)
VLDL: 31 MG/DL (ref 0–40)
WBC # BLD AUTO: 6.2 X10*3/UL (ref 4.4–11.3)

## 2024-02-21 PROCEDURE — 36415 COLL VENOUS BLD VENIPUNCTURE: CPT

## 2024-02-21 PROCEDURE — 85652 RBC SED RATE AUTOMATED: CPT

## 2024-02-21 PROCEDURE — 80053 COMPREHEN METABOLIC PANEL: CPT

## 2024-02-21 PROCEDURE — 85025 COMPLETE CBC W/AUTO DIFF WBC: CPT

## 2024-02-21 PROCEDURE — 80061 LIPID PANEL: CPT

## 2024-02-21 PROCEDURE — G0103 PSA SCREENING: HCPCS

## 2024-02-25 PROBLEM — N18.32 STAGE 3B CHRONIC KIDNEY DISEASE (MULTI): Status: ACTIVE | Noted: 2024-02-25

## 2024-02-26 ENCOUNTER — TELEPHONE (OUTPATIENT)
Dept: PRIMARY CARE | Facility: CLINIC | Age: 84
End: 2024-02-26
Payer: MEDICARE

## 2024-02-26 DIAGNOSIS — N18.32 STAGE 3B CHRONIC KIDNEY DISEASE (MULTI): ICD-10-CM

## 2024-02-26 NOTE — TELEPHONE ENCOUNTER
----- Message from Gokul Sahu DO sent at 2/25/2024 10:43 PM EST -----  Review of laboratory results indicates elevated creatinine/kidney test.  Recommend good hydration with at least 40 ounces of fluid per day that is, five 8 ounce glasses of water per day.  Also repeat BMP /nonfasting lab in 1 month.  Thank you

## 2024-02-26 NOTE — TELEPHONE ENCOUNTER
Per DIMAS Abnormal kidney function. Advised patient to stay hydrated with 5 8 oz glass of water per day. Recommend to repeat BMP in 1 month.

## 2024-04-12 DIAGNOSIS — I10 ESSENTIAL HYPERTENSION, BENIGN: Primary | ICD-10-CM

## 2024-04-16 RX ORDER — RAMIPRIL 2.5 MG/1
2.5 CAPSULE ORAL DAILY
Qty: 90 CAPSULE | Refills: 0 | Status: SHIPPED | OUTPATIENT
Start: 2024-04-16 | End: 2024-07-15

## 2024-04-25 DIAGNOSIS — E78.2 MIXED HYPERLIPIDEMIA: ICD-10-CM

## 2024-04-25 DIAGNOSIS — K21.9 GASTROESOPHAGEAL REFLUX DISEASE WITHOUT ESOPHAGITIS: ICD-10-CM

## 2024-04-25 RX ORDER — PANTOPRAZOLE SODIUM 40 MG/1
40 TABLET, DELAYED RELEASE ORAL DAILY
Qty: 90 TABLET | Refills: 1 | Status: SHIPPED | OUTPATIENT
Start: 2024-04-25

## 2024-05-29 DIAGNOSIS — I25.10 ATHEROSCLEROSIS OF NATIVE CORONARY ARTERY OF NATIVE HEART WITHOUT ANGINA PECTORIS: Primary | ICD-10-CM

## 2024-06-05 ENCOUNTER — TELEPHONE (OUTPATIENT)
Dept: CARDIOLOGY | Facility: CLINIC | Age: 84
End: 2024-06-05
Payer: MEDICARE

## 2024-06-05 RX ORDER — CLOPIDOGREL BISULFATE 75 MG/1
75 TABLET ORAL DAILY
Qty: 90 TABLET | Refills: 3 | Status: SHIPPED | OUTPATIENT
Start: 2024-06-05 | End: 2025-06-05

## 2024-06-05 NOTE — TELEPHONE ENCOUNTER
----- Message from Chel Masters MD sent at 6/5/2024  3:11 PM EDT -----  Renewed clopidogrel - could not see that in my note.

## 2024-06-14 DIAGNOSIS — I10 ESSENTIAL HYPERTENSION, BENIGN: ICD-10-CM

## 2024-06-17 DIAGNOSIS — E78.2 MIXED HYPERLIPIDEMIA: ICD-10-CM

## 2024-06-17 DIAGNOSIS — I25.5 ISCHEMIC CARDIOMYOPATHY: Primary | ICD-10-CM

## 2024-06-18 RX ORDER — RAMIPRIL 2.5 MG/1
2.5 CAPSULE ORAL DAILY
Qty: 90 CAPSULE | Refills: 0 | Status: SHIPPED | OUTPATIENT
Start: 2024-06-18 | End: 2024-09-16

## 2024-06-18 RX ORDER — ATORVASTATIN CALCIUM 80 MG/1
80 TABLET, FILM COATED ORAL DAILY
Qty: 90 TABLET | Refills: 0 | Status: SHIPPED | OUTPATIENT
Start: 2024-06-18 | End: 2024-09-16

## 2024-07-06 DIAGNOSIS — I10 ESSENTIAL HYPERTENSION, BENIGN: ICD-10-CM

## 2024-07-06 DIAGNOSIS — I25.10 CORONARY ARTERIOSCLEROSIS: Primary | ICD-10-CM

## 2024-07-06 DIAGNOSIS — I25.10 ATHEROSCLEROSIS OF NATIVE CORONARY ARTERY OF NATIVE HEART WITHOUT ANGINA PECTORIS: ICD-10-CM

## 2024-07-08 RX ORDER — METOPROLOL SUCCINATE 25 MG/1
25 TABLET, EXTENDED RELEASE ORAL DAILY
Qty: 90 TABLET | Refills: 0 | Status: SHIPPED | OUTPATIENT
Start: 2024-07-08 | End: 2024-10-06

## 2024-07-10 ENCOUNTER — LAB (OUTPATIENT)
Dept: LAB | Facility: LAB | Age: 84
End: 2024-07-10
Payer: MEDICARE

## 2024-07-10 DIAGNOSIS — E78.2 MIXED HYPERLIPIDEMIA: ICD-10-CM

## 2024-07-10 DIAGNOSIS — N18.32 STAGE 3B CHRONIC KIDNEY DISEASE (MULTI): ICD-10-CM

## 2024-07-10 LAB
ALBUMIN SERPL BCP-MCNC: 4 G/DL (ref 3.4–5)
ALP SERPL-CCNC: 94 U/L (ref 33–136)
ALT SERPL W P-5'-P-CCNC: 18 U/L (ref 10–52)
ANION GAP SERPL CALC-SCNC: 12 MMOL/L (ref 10–20)
AST SERPL W P-5'-P-CCNC: 20 U/L (ref 9–39)
BILIRUB SERPL-MCNC: 1.1 MG/DL (ref 0–1.2)
BUN SERPL-MCNC: 27 MG/DL (ref 6–23)
CALCIUM SERPL-MCNC: 9.1 MG/DL (ref 8.6–10.3)
CHLORIDE SERPL-SCNC: 106 MMOL/L (ref 98–107)
CHOLEST SERPL-MCNC: 144 MG/DL (ref 0–199)
CHOLESTEROL/HDL RATIO: 4.5
CO2 SERPL-SCNC: 26 MMOL/L (ref 21–32)
CREAT SERPL-MCNC: 1.55 MG/DL (ref 0.5–1.3)
EGFRCR SERPLBLD CKD-EPI 2021: 45 ML/MIN/1.73M*2
GLUCOSE SERPL-MCNC: 135 MG/DL (ref 74–99)
HDLC SERPL-MCNC: 32 MG/DL
LDLC SERPL CALC-MCNC: 72 MG/DL
NON HDL CHOLESTEROL: 112 MG/DL (ref 0–149)
POTASSIUM SERPL-SCNC: 4.8 MMOL/L (ref 3.5–5.3)
PROT SERPL-MCNC: 6.3 G/DL (ref 6.4–8.2)
SODIUM SERPL-SCNC: 139 MMOL/L (ref 136–145)
TRIGL SERPL-MCNC: 200 MG/DL (ref 0–149)
VLDL: 40 MG/DL (ref 0–40)

## 2024-07-10 PROCEDURE — 80061 LIPID PANEL: CPT

## 2024-07-10 PROCEDURE — 36415 COLL VENOUS BLD VENIPUNCTURE: CPT

## 2024-07-10 PROCEDURE — 80053 COMPREHEN METABOLIC PANEL: CPT

## 2024-07-18 ENCOUNTER — APPOINTMENT (OUTPATIENT)
Dept: CARDIOLOGY | Facility: CLINIC | Age: 84
End: 2024-07-18
Payer: MEDICARE

## 2024-07-18 VITALS
DIASTOLIC BLOOD PRESSURE: 74 MMHG | HEART RATE: 77 BPM | WEIGHT: 266 LBS | BODY MASS INDEX: 35.25 KG/M2 | HEIGHT: 73 IN | SYSTOLIC BLOOD PRESSURE: 122 MMHG

## 2024-07-18 DIAGNOSIS — I10 ESSENTIAL HYPERTENSION, BENIGN: ICD-10-CM

## 2024-07-18 DIAGNOSIS — I25.10 ATHEROSCLEROSIS OF NATIVE CORONARY ARTERY OF NATIVE HEART WITHOUT ANGINA PECTORIS: ICD-10-CM

## 2024-07-18 DIAGNOSIS — E78.2 MIXED HYPERLIPIDEMIA: ICD-10-CM

## 2024-07-18 DIAGNOSIS — R06.02 SHORTNESS OF BREATH ON EXERTION: Primary | ICD-10-CM

## 2024-07-18 PROCEDURE — 3074F SYST BP LT 130 MM HG: CPT | Performed by: INTERNAL MEDICINE

## 2024-07-18 PROCEDURE — 1159F MED LIST DOCD IN RCRD: CPT | Performed by: INTERNAL MEDICINE

## 2024-07-18 PROCEDURE — 1036F TOBACCO NON-USER: CPT | Performed by: INTERNAL MEDICINE

## 2024-07-18 PROCEDURE — 3078F DIAST BP <80 MM HG: CPT | Performed by: INTERNAL MEDICINE

## 2024-07-18 PROCEDURE — 1157F ADVNC CARE PLAN IN RCRD: CPT | Performed by: INTERNAL MEDICINE

## 2024-07-18 PROCEDURE — 99214 OFFICE O/P EST MOD 30 MIN: CPT | Performed by: INTERNAL MEDICINE

## 2024-07-18 PROCEDURE — 1160F RVW MEDS BY RX/DR IN RCRD: CPT | Performed by: INTERNAL MEDICINE

## 2024-07-18 ASSESSMENT — ENCOUNTER SYMPTOMS
PSYCHIATRIC NEGATIVE: 1
CARDIOVASCULAR NEGATIVE: 1
FEVER: 0
NEUROLOGICAL NEGATIVE: 1
COUGH: 0
GASTROINTESTINAL NEGATIVE: 1
DIFFICULTY URINATING: 1
BACK PAIN: 1
ARTHRALGIAS: 1
FATIGUE: 1
SHORTNESS OF BREATH: 1

## 2024-07-18 NOTE — PROGRESS NOTES
Patient:  Michael Light  YOB: 1940  MRN: [id number]       Chief Complaint/Active Symptoms:       Michael Light is a 84 y.o. male who returns today for cardiac follow-up.    Here for annual follow-up. Has more SOB on exertion without orthopnea or PND. Mostly with stairs but notices he tires out a little more easily. Knows he is not drinking enough water. Has also noted BP is higher than usual but at home is in high 130's and low 140's. While his weight is the same and fluctuates mildly - he feels he is more fat than muscle now.     Therapy because he was told by his cardiologist many years ago that he would need to remain on it lifelong in addition to aspirin.  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  Will await the result of his testing at his discretion and if the patient feels more comfortable as long as is not creating any problems he is aware of the increased risk of intracranial hemorrhage on dual platelet inhibitors and increased risk of bleeding and will be his decision.    Review of Systems   Constitutional:  Positive for fatigue. Negative for fever.   Respiratory:  Positive for shortness of breath. Negative for cough.    Cardiovascular: Negative.    Gastrointestinal: Negative.    Genitourinary:  Positive for difficulty urinating.   Musculoskeletal:  Positive for arthralgias and back pain.   Neurological: Negative.    Psychiatric/Behavioral: Negative.     All other systems reviewed and are negative.      Objective:     Vitals:    07/18/24 1002   BP: 122/74   Pulse: 77       Vitals:    07/18/24 1002   Weight: 121 kg (266 lb)       Allergies:     No Known Allergies     Medications:     Current Outpatient Medications   Medication Instructions    aspirin 81 mg, oral, Daily    atorvastatin (LIPITOR) 80 mg, oral, Daily    clopidogrel (PLAVIX) 75 mg,  oral, Daily    fish oil concentrate (Omega-3) 120-180 mg capsule oral    metoprolol succinate XL (TOPROL-XL) 25 mg, oral, Daily    nitroglycerin (NITROSTAT) 0.4 mg, sublingual, Every 5 min PRN    omeprazole OTC (PRILOSEC OTC) 20 mg, oral, Daily, Do not crush, chew, or split.    ramipril (ALTACE) 2.5 mg, oral, Daily       Physical Examination:   GENERAL:  Well developed, well nourished, in no acute distress.  HEENT: NC AT, EOMI with anicteric sclera  NECK:  reduced ROM, no JVD, no bruit.  CHEST:  Symmetric and nontender.  LUNGS:  Clear to auscultation bilaterally, normal respiratory effort.  HEART:  PMI non-palpable, RRR with normal S1 and S2, no S3 or appreciable murmur. No edema, normal distal pulses, no carotid or abdominal bruits.   ABDOMEN: Soft, NT, ND without palpable organomegaly or bruits  EXTREMITIES:  Warm with good color, no clubbing or cyanosis.  There is no edema noted.  PERIPHERAL VASCULAR:  Pulses present and equally palpable; 2+ throughout.  MUSCULOSKELETAL: OA changes  NEURO/PSYCH:  Alert and oriented times three with approppriate behavior and responses. Nonfocal motor examination with normal gait and ambulation  Lymph: No significant palpable lymphadenopathy  Skin: no rash or lesions on exposed skin or reported.    Lab:     CBC:   Lab Results   Component Value Date    WBC 6.2 02/21/2024    RBC 5.09 02/21/2024    HGB 14.8 02/21/2024    HCT 44.7 02/21/2024     02/21/2024        CMP:    Lab Results   Component Value Date     07/10/2024    K 4.8 07/10/2024     07/10/2024    CO2 26 07/10/2024    BUN 27 (H) 07/10/2024    CREATININE 1.55 (H) 07/10/2024    GLUCOSE 135 (H) 07/10/2024    CALCIUM 9.1 07/10/2024       Magnesium:    Lab Results   Component Value Date    MG 2.00 08/11/2021       Lipid Profile:    Lab Results   Component Value Date    TRIG 200 (H) 07/10/2024    HDL 32.0 07/10/2024    LDLCALC 72 07/10/2024       TSH:    Lab Results   Component Value Date    TSH 1.29 08/11/2021  "      BNP:   Lab Results   Component Value Date     (H) 04/19/2023        PT/INR:    No results found for: \"PROTIME\", \"INR\"    HgBA1c:    Lab Results   Component Value Date    HGBA1C 6.1 08/11/2021       BMP:  Lab Results   Component Value Date     07/10/2024     02/21/2024     04/19/2023     02/04/2022     (L) 02/03/2022    K 4.8 07/10/2024    K 4.7 02/21/2024    K 4.5 04/19/2023    K 4.4 02/04/2022    K 4.5 02/03/2022     07/10/2024     02/21/2024     (H) 04/19/2023     (H) 02/04/2022     02/03/2022    CO2 26 07/10/2024    CO2 26 02/21/2024    CO2 24 04/19/2023    CO2 26 02/04/2022    CO2 23 02/03/2022    BUN 27 (H) 07/10/2024    BUN 25 (H) 02/21/2024    BUN 23 04/19/2023    BUN 23 02/04/2022    BUN 24 (H) 02/03/2022    CREATININE 1.55 (H) 07/10/2024    CREATININE 1.38 (H) 02/21/2024    CREATININE 1.31 (H) 04/19/2023    CREATININE 1.12 02/04/2022    CREATININE 1.25 02/03/2022       Cardiac Enzymes:    No results found for: \"TROPHS\"    Hepatic Function Panel:    Lab Results   Component Value Date    ALKPHOS 94 07/10/2024    ALT 18 07/10/2024    AST 20 07/10/2024    PROT 6.3 (L) 07/10/2024    BILITOT 1.1 07/10/2024    BILIDIR 0.2 10/13/2022         Diagnostic Studies:     No echocardiogram results found for the past 12 months  Last echo 9/2021 with EF 55%. No significant valvular heart disease.     No nuclear medicine results found for the past 12 months  Last nuclear stress 8/2020 with fixed inferior defect, EF 51%.     No valid procedures specified.    EKG:   No results found for: \"EKG\"    Radiology:     Transthoracic echo (TTE) complete    (Results Pending)     ASSESSMENT     Problem List Items Addressed This Visit       Atherosclerosis of native coronary artery of native heart without angina pectoris    Essential hypertension, benign    Hyperlipidemia    Shortness of breath on exertion - Primary    Relevant Orders    Transthoracic echo (TTE) " complete       PLAN     1.  Atherosclerotic coronary artery disease stable without angina pectoris.  Continue conservative medical therapy.  If LV function remains the same we will continue it and consider stress testing next year.  2.  Shortness of breath on exertion.  Hard to separate whether this is just an expected change from aging and gradually slowing down by but there is no overt signs of heart failure.  Will repeat his echocardiogram.  If the echo shows a drop in LV function we will repeat his ischemic workup earlier.  3.  Essential hypertension.  Blood pressures are controlled on his current medical regimen.  4.  Hyperlipidemia.  Continue statin therapy and reinforced diet, exercise and weight loss.    Even if his echo shows no change in his LV function will see him back in 6 months to reassess his symptoms sooner if he has any worsening or progression.

## 2024-08-13 DIAGNOSIS — E78.2 MIXED HYPERLIPIDEMIA: ICD-10-CM

## 2024-08-13 RX ORDER — ATORVASTATIN CALCIUM 80 MG/1
80 TABLET, FILM COATED ORAL DAILY
Qty: 90 TABLET | Refills: 3 | Status: SHIPPED | OUTPATIENT
Start: 2024-08-13

## 2024-08-14 DIAGNOSIS — I10 ESSENTIAL HYPERTENSION, BENIGN: ICD-10-CM

## 2024-08-16 RX ORDER — RAMIPRIL 2.5 MG/1
2.5 CAPSULE ORAL DAILY
Qty: 90 CAPSULE | Refills: 1 | Status: SHIPPED | OUTPATIENT
Start: 2024-08-16 | End: 2025-02-12

## 2024-08-19 ENCOUNTER — APPOINTMENT (OUTPATIENT)
Dept: PRIMARY CARE | Facility: CLINIC | Age: 84
End: 2024-08-19
Payer: MEDICARE

## 2024-08-19 VITALS
DIASTOLIC BLOOD PRESSURE: 60 MMHG | RESPIRATION RATE: 16 BRPM | HEART RATE: 57 BPM | OXYGEN SATURATION: 97 % | HEIGHT: 73 IN | SYSTOLIC BLOOD PRESSURE: 110 MMHG | WEIGHT: 262.2 LBS | TEMPERATURE: 97.6 F | BODY MASS INDEX: 34.75 KG/M2

## 2024-08-19 DIAGNOSIS — E11.9 TYPE 2 DIABETES MELLITUS WITHOUT COMPLICATION, WITHOUT LONG-TERM CURRENT USE OF INSULIN (MULTI): Primary | ICD-10-CM

## 2024-08-19 DIAGNOSIS — E07.9 THYROID DISORDER: ICD-10-CM

## 2024-08-19 DIAGNOSIS — R61 NIGHT SWEATS: ICD-10-CM

## 2024-08-19 DIAGNOSIS — E66.09 EXOGENOUS OBESITY: ICD-10-CM

## 2024-08-19 DIAGNOSIS — N18.32 STAGE 3B CHRONIC KIDNEY DISEASE (MULTI): ICD-10-CM

## 2024-08-19 LAB
POC FINGERSTICK BLOOD GLUCOSE: 118 MG/DL (ref 70–100)
POC HEMOGLOBIN A1C: 6.1 % (ref 4.2–6.5)

## 2024-08-19 PROCEDURE — 3078F DIAST BP <80 MM HG: CPT | Performed by: FAMILY MEDICINE

## 2024-08-19 PROCEDURE — 1123F ACP DISCUSS/DSCN MKR DOCD: CPT | Performed by: FAMILY MEDICINE

## 2024-08-19 PROCEDURE — 1159F MED LIST DOCD IN RCRD: CPT | Performed by: FAMILY MEDICINE

## 2024-08-19 PROCEDURE — 83036 HEMOGLOBIN GLYCOSYLATED A1C: CPT | Performed by: FAMILY MEDICINE

## 2024-08-19 PROCEDURE — 1157F ADVNC CARE PLAN IN RCRD: CPT | Performed by: FAMILY MEDICINE

## 2024-08-19 PROCEDURE — 99214 OFFICE O/P EST MOD 30 MIN: CPT | Performed by: FAMILY MEDICINE

## 2024-08-19 PROCEDURE — G2211 COMPLEX E/M VISIT ADD ON: HCPCS | Performed by: FAMILY MEDICINE

## 2024-08-19 PROCEDURE — 1158F ADVNC CARE PLAN TLK DOCD: CPT | Performed by: FAMILY MEDICINE

## 2024-08-19 PROCEDURE — 1036F TOBACCO NON-USER: CPT | Performed by: FAMILY MEDICINE

## 2024-08-19 PROCEDURE — 3074F SYST BP LT 130 MM HG: CPT | Performed by: FAMILY MEDICINE

## 2024-08-19 PROCEDURE — 1160F RVW MEDS BY RX/DR IN RCRD: CPT | Performed by: FAMILY MEDICINE

## 2024-08-19 PROCEDURE — 82962 GLUCOSE BLOOD TEST: CPT | Performed by: FAMILY MEDICINE

## 2024-08-19 ASSESSMENT — ENCOUNTER SYMPTOMS
DIZZINESS: 0
PHOTOPHOBIA: 0
FATIGUE: 0
COUGH: 0
ACTIVITY CHANGE: 0
HEMATURIA: 0
ABDOMINAL DISTENTION: 0
ARTHRALGIAS: 1
SPEECH DIFFICULTY: 0
TROUBLE SWALLOWING: 0
POLYPHAGIA: 0
CHEST TIGHTNESS: 0
DYSURIA: 0
DECREASED CONCENTRATION: 0
CONSTIPATION: 0
NERVOUS/ANXIOUS: 0
SINUS PRESSURE: 0
APPETITE CHANGE: 0
DIARRHEA: 0
ABDOMINAL PAIN: 0
EYE PAIN: 0
CONSTITUTIONAL NEGATIVE: 1
FLANK PAIN: 0
PALPITATIONS: 0
AGITATION: 0
SEIZURES: 0
NECK STIFFNESS: 0
POLYDIPSIA: 0
RECTAL PAIN: 0
RHINORRHEA: 0
FEVER: 0
BLOOD IN STOOL: 0
ADENOPATHY: 0
DYSPHORIC MOOD: 0
SLEEP DISTURBANCE: 0
SHORTNESS OF BREATH: 0
HEADACHES: 0
DEPRESSION: 0
SINUS PAIN: 0
COLOR CHANGE: 0
CONFUSION: 0
SORE THROAT: 0
MYALGIAS: 0
STRIDOR: 0

## 2024-08-19 NOTE — PATIENT INSTRUCTIONS
Follow up 10 days post renal sonogram with regular follow-up visit in 3 months    Continue current medications and therapy for chronic medical conditions.    Patient was advised importance of proper diet/nutrition in addition adequate hydration. Patient was encouraged moderate exercise program to include 30 minutes daily for 5 days of the week or 150 minutes weekly. Patient will follow-up with us as scheduled.    Review lab results from July 2024    IO A1C and Glucose today     Obtain US of renals    Referred to endocrinology/night sweats

## 2024-08-19 NOTE — PROGRESS NOTES
"Subjective   Patient ID: Michael Light is a 84 y.o. male who presents for Results.    HPI   The patient is in office to go over his blood work results.     Review of Systems   Constitutional: Negative.  Negative for activity change, appetite change, fatigue and fever.   HENT:  Negative for congestion, dental problem, ear discharge, ear pain, mouth sores, rhinorrhea, sinus pressure, sinus pain, sore throat, tinnitus and trouble swallowing.    Eyes:  Negative for photophobia, pain and visual disturbance.   Respiratory:  Negative for cough, chest tightness, shortness of breath and stridor.    Cardiovascular:  Negative for chest pain and palpitations.   Gastrointestinal:  Negative for abdominal distention, abdominal pain, blood in stool, constipation, diarrhea and rectal pain.   Endocrine: Negative for cold intolerance, heat intolerance, polydipsia, polyphagia and polyuria.   Genitourinary:  Negative for dysuria, flank pain, hematuria and urgency.   Musculoskeletal:  Positive for arthralgias. Negative for gait problem, myalgias and neck stiffness.   Skin:  Negative for color change and rash.   Allergic/Immunologic: Negative for environmental allergies and food allergies.   Neurological:  Negative for dizziness, seizures, syncope, speech difficulty and headaches.   Hematological:  Negative for adenopathy.   Psychiatric/Behavioral:  Negative for agitation, confusion, decreased concentration, dysphoric mood and sleep disturbance. The patient is not nervous/anxious.        Objective   /60 (BP Location: Right arm, Patient Position: Sitting)   Pulse 57   Temp 36.4 °C (97.6 °F) (Temporal)   Resp 16   Ht 1.854 m (6' 1\")   Wt 119 kg (262 lb 3.2 oz)   SpO2 97%   BMI 34.59 kg/m²     Physical Exam  Vitals reviewed.   Constitutional:       General: He is not in acute distress.     Appearance: He is obese. He is not ill-appearing or diaphoretic.   HENT:      Head: Normocephalic.      Right Ear: Tympanic membrane and " external ear normal.      Left Ear: Tympanic membrane and external ear normal.      Nose: Nose normal. No congestion.      Mouth/Throat:      Pharynx: No posterior oropharyngeal erythema.   Eyes:      General:         Right eye: No discharge.         Left eye: No discharge.      Extraocular Movements: Extraocular movements intact.      Conjunctiva/sclera: Conjunctivae normal.      Pupils: Pupils are equal, round, and reactive to light.   Cardiovascular:      Rate and Rhythm: Normal rate and regular rhythm.      Pulses: Normal pulses.      Heart sounds: Normal heart sounds. No murmur heard.  Pulmonary:      Effort: Pulmonary effort is normal. No respiratory distress.      Breath sounds: Normal breath sounds. No wheezing or rales.   Chest:      Chest wall: No tenderness.   Abdominal:      General: Bowel sounds are normal. There is distension.      Palpations: There is no mass.      Tenderness: There is no abdominal tenderness. There is no guarding.   Musculoskeletal:         General: No tenderness. Normal range of motion.      Cervical back: Normal range of motion and neck supple. No tenderness.      Right lower leg: No edema.      Left lower leg: No edema.   Skin:     General: Skin is dry.      Coloration: Skin is not jaundiced.      Findings: No bruising, erythema or rash.   Neurological:      General: No focal deficit present.      Mental Status: He is alert and oriented to person, place, and time. Mental status is at baseline.      Cranial Nerves: No cranial nerve deficit.      Sensory: No sensory deficit.      Coordination: Coordination normal.      Gait: Gait normal.   Psychiatric:         Mood and Affect: Mood normal.         Thought Content: Thought content normal.         Judgment: Judgment normal.         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   Provider Attestation - Scribe documentation    All medical record entries made by the Scribe were at my direction and personally dictated by me. I have reviewed the chart and agree that the record accurately reflects my personal performance of the history, physical exam, discussion and plan.

## 2024-08-26 ENCOUNTER — HOSPITAL ENCOUNTER (OUTPATIENT)
Dept: RADIOLOGY | Facility: CLINIC | Age: 84
Discharge: HOME | End: 2024-08-26
Payer: MEDICARE

## 2024-08-26 DIAGNOSIS — N18.32 STAGE 3B CHRONIC KIDNEY DISEASE (MULTI): ICD-10-CM

## 2024-08-26 PROCEDURE — 76770 US EXAM ABDO BACK WALL COMP: CPT

## 2024-08-26 PROCEDURE — 76770 US EXAM ABDO BACK WALL COMP: CPT | Performed by: STUDENT IN AN ORGANIZED HEALTH CARE EDUCATION/TRAINING PROGRAM

## 2024-09-07 DIAGNOSIS — I10 ESSENTIAL HYPERTENSION, BENIGN: ICD-10-CM

## 2024-09-07 DIAGNOSIS — I25.10 ATHEROSCLEROSIS OF NATIVE CORONARY ARTERY OF NATIVE HEART WITHOUT ANGINA PECTORIS: ICD-10-CM

## 2024-09-09 ENCOUNTER — HOSPITAL ENCOUNTER (OUTPATIENT)
Dept: CARDIOLOGY | Facility: CLINIC | Age: 84
Discharge: HOME | End: 2024-09-09
Payer: MEDICARE

## 2024-09-09 VITALS
DIASTOLIC BLOOD PRESSURE: 74 MMHG | HEIGHT: 73 IN | SYSTOLIC BLOOD PRESSURE: 132 MMHG | WEIGHT: 266 LBS | BODY MASS INDEX: 35.25 KG/M2

## 2024-09-09 DIAGNOSIS — R06.02 SHORTNESS OF BREATH ON EXERTION: ICD-10-CM

## 2024-09-09 PROCEDURE — 93306 TTE W/DOPPLER COMPLETE: CPT | Performed by: INTERNAL MEDICINE

## 2024-09-09 PROCEDURE — 93306 TTE W/DOPPLER COMPLETE: CPT

## 2024-09-10 LAB
AORTIC VALVE MEAN GRADIENT: 4.1 MMHG
AORTIC VALVE PEAK VELOCITY: 1.3 M/S
AV PEAK GRADIENT: 6.7 MMHG
AVA (PEAK VEL): 3.14 CM2
AVA (VTI): 3.09 CM2
EJECTION FRACTION APICAL 4 CHAMBER: 54.2
EJECTION FRACTION: 50 %
LEFT ATRIUM VOLUME AREA LENGTH INDEX BSA: 15.3 ML/M2
LEFT VENTRICLE INTERNAL DIMENSION DIASTOLE: 4.33 CM (ref 3.5–6)
LEFT VENTRICULAR OUTFLOW TRACT DIAMETER: 2.4 CM
LV EJECTION FRACTION BIPLANE: 50 %
MITRAL VALVE E/A RATIO: 0.56
RIGHT VENTRICLE PEAK SYSTOLIC PRESSURE: 11.2 MMHG

## 2024-09-10 RX ORDER — METOPROLOL SUCCINATE 25 MG/1
25 TABLET, EXTENDED RELEASE ORAL DAILY
Qty: 90 TABLET | Refills: 3 | Status: SHIPPED | OUTPATIENT
Start: 2024-09-10

## 2024-09-11 ENCOUNTER — TELEPHONE (OUTPATIENT)
Dept: PRIMARY CARE | Facility: CLINIC | Age: 84
End: 2024-09-11
Payer: MEDICARE

## 2024-09-11 DIAGNOSIS — I25.10 ATHEROSCLEROSIS OF NATIVE CORONARY ARTERY OF NATIVE HEART WITHOUT ANGINA PECTORIS: ICD-10-CM

## 2024-09-11 DIAGNOSIS — I10 ESSENTIAL HYPERTENSION, BENIGN: ICD-10-CM

## 2024-09-11 NOTE — TELEPHONE ENCOUNTER
----- Message from Nurse Tiffany PERLA sent at 9/11/2024 10:08 AM EDT -----    ----- Message -----  From: Chel Masters MD  Sent: 9/10/2024  11:49 PM EDT  To: Tiffany Echevarria LPN    Echo shows low normal heart function and mild valve abnormalities. No significant change from prior study. Follow up as recommended at last appointment.  ----- Message -----  From: Cristo Syngo - Cardiology Results In  Sent: 9/10/2024   9:09 PM EDT  To: Chel Masters MD

## 2024-09-17 RX ORDER — METOPROLOL SUCCINATE 25 MG/1
25 TABLET, EXTENDED RELEASE ORAL DAILY
Refills: 0 | OUTPATIENT
Start: 2024-09-17

## 2024-11-27 ENCOUNTER — APPOINTMENT (OUTPATIENT)
Dept: PRIMARY CARE | Facility: CLINIC | Age: 84
End: 2024-11-27
Payer: MEDICARE

## 2024-12-02 ENCOUNTER — APPOINTMENT (OUTPATIENT)
Dept: PRIMARY CARE | Facility: CLINIC | Age: 84
End: 2024-12-02
Payer: MEDICARE

## 2025-01-13 DIAGNOSIS — I10 ESSENTIAL HYPERTENSION, BENIGN: ICD-10-CM

## 2025-01-16 ENCOUNTER — APPOINTMENT (OUTPATIENT)
Dept: CARDIOLOGY | Facility: CLINIC | Age: 85
End: 2025-01-16
Payer: MEDICARE

## 2025-01-17 ENCOUNTER — APPOINTMENT (OUTPATIENT)
Dept: CARDIOLOGY | Facility: CLINIC | Age: 85
End: 2025-01-17
Payer: MEDICARE

## 2025-01-17 VITALS
BODY MASS INDEX: 35.09 KG/M2 | SYSTOLIC BLOOD PRESSURE: 88 MMHG | HEART RATE: 68 BPM | WEIGHT: 266 LBS | DIASTOLIC BLOOD PRESSURE: 64 MMHG

## 2025-01-17 DIAGNOSIS — I10 ESSENTIAL HYPERTENSION, BENIGN: ICD-10-CM

## 2025-01-17 DIAGNOSIS — I25.10 ATHEROSCLEROSIS OF NATIVE CORONARY ARTERY OF NATIVE HEART WITHOUT ANGINA PECTORIS: ICD-10-CM

## 2025-01-17 DIAGNOSIS — I45.10 RIGHT BUNDLE BRANCH BLOCK: Primary | ICD-10-CM

## 2025-01-17 DIAGNOSIS — E78.2 MIXED HYPERLIPIDEMIA: ICD-10-CM

## 2025-01-17 DIAGNOSIS — I95.1 ORTHOSTATIC HYPOTENSION: ICD-10-CM

## 2025-01-17 DIAGNOSIS — N18.32 STAGE 3B CHRONIC KIDNEY DISEASE (MULTI): ICD-10-CM

## 2025-01-17 PROCEDURE — 1157F ADVNC CARE PLAN IN RCRD: CPT | Performed by: INTERNAL MEDICINE

## 2025-01-17 PROCEDURE — G2211 COMPLEX E/M VISIT ADD ON: HCPCS | Performed by: INTERNAL MEDICINE

## 2025-01-17 PROCEDURE — 99214 OFFICE O/P EST MOD 30 MIN: CPT | Performed by: INTERNAL MEDICINE

## 2025-01-17 PROCEDURE — 1159F MED LIST DOCD IN RCRD: CPT | Performed by: INTERNAL MEDICINE

## 2025-01-17 PROCEDURE — 1123F ACP DISCUSS/DSCN MKR DOCD: CPT | Performed by: INTERNAL MEDICINE

## 2025-01-17 PROCEDURE — 3078F DIAST BP <80 MM HG: CPT | Performed by: INTERNAL MEDICINE

## 2025-01-17 PROCEDURE — 1160F RVW MEDS BY RX/DR IN RCRD: CPT | Performed by: INTERNAL MEDICINE

## 2025-01-17 PROCEDURE — 93000 ELECTROCARDIOGRAM COMPLETE: CPT | Performed by: INTERNAL MEDICINE

## 2025-01-17 PROCEDURE — 3074F SYST BP LT 130 MM HG: CPT | Performed by: INTERNAL MEDICINE

## 2025-01-17 PROCEDURE — 1036F TOBACCO NON-USER: CPT | Performed by: INTERNAL MEDICINE

## 2025-01-17 ASSESSMENT — ENCOUNTER SYMPTOMS
LIGHT-HEADEDNESS: 1
BACK PAIN: 1
ABDOMINAL PAIN: 1
ARTHRALGIAS: 1
DIFFICULTY URINATING: 1
CARDIOVASCULAR NEGATIVE: 1
FATIGUE: 1

## 2025-01-17 NOTE — PATIENT INSTRUCTIONS
Drink minimum of 64 oz daily and drink at least 32 oz before noon and 16-24 oz in the afternoon, and 16 oz around dinner/evening meal    Return in 1 month.

## 2025-01-17 NOTE — PROGRESS NOTES
Patient:  Michael Light  YOB: 1940  MRN: [id number]       Chief Complaint/Active Symptoms:       Michael Light is a 84 y.o. male who returns today for cardiac follow-up.    Patient is here for elective 6-month follow-up visit of coronary artery disease, hypertension hyperlipidemia with some chronic shortness of breath on exertion.  Associated with his appointment in July of this year when he complained of shortness of breath and echocardiogram of been requested showed low normal ejection fraction of 50% with grade 1 diastolic dysfunction and left ventricular hypertrophy.  No significant valvular abnormalities were seen.  Lab work around that time showed some moderate CKD with creatinine of 1.55 and a GFR of 45.  BN peptide was not done.    Patient has noted that his BP is higher than it used to be. Had problems with BP being low so that he had stopped his metoprolol for a week. Has since restarted it and his BP has remain elevated at time.     Started checking his blood pressure more frequently especially as he occasionally will have dizziness/lightheadedness. He notices that his blood pressure drops when he stands up. Is drinking more water but doesn't drink fluids really until about 4 pm and then drinks 48-64 oz at that time.     Review of Systems   Constitutional:  Positive for fatigue.   Cardiovascular: Negative.    Gastrointestinal:  Positive for abdominal pain (improved after stopping aspirin).   Genitourinary:  Positive for difficulty urinating.   Musculoskeletal:  Positive for arthralgias and back pain.   Neurological:  Positive for light-headedness.   All other systems reviewed and are negative.      Objective:     Visit Vitals  BP 88/64 (BP Location: Right arm, Patient Position: Standing)   Pulse 68   Wt 121 kg (266 lb)   BMI 35.09 kg/m²   Smoking Status Former   BSA 2.5 m²         Allergies:     No Known Allergies       Medications:     Current Outpatient Medications   Medication  Instructions    atorvastatin (LIPITOR) 80 mg, oral, Daily    clopidogrel (PLAVIX) 75 mg, oral, Daily    fish oil concentrate (Omega-3) 120-180 mg capsule Take by mouth.    metoprolol succinate XL (TOPROL-XL) 25 mg, oral, Daily    nitroglycerin (NITROSTAT) 0.4 mg, Every 5 min PRN    omeprazole OTC (PRILOSEC OTC) 20 mg, oral, Daily, Do not crush, chew, or split.    ramipril (ALTACE) 2.5 mg, oral, Daily       Physical Examination:   GENERAL:  Well developed, well nourished, in no acute distress.  HEENT: NC AT, EOMI with anicteric sclera  NECK:  Supple, no JVD, no bruit.  CHEST:  Symmetric and nontender.  LUNGS:  Clear to auscultation bilaterally, normal respiratory effort.  HEART: PMI is not palpable.  Heart sounds are distant.  There is a regular rhythm with a normal S1 and S2 no appreciable S3 or murmur.  No carotid or abdominal bruits.  Pedal pulses are normal with trace pedal edema.  ABDOMEN: Soft, NT, ND without palpable organomegaly or bruits  EXTREMITIES:  Warm with good color, no clubbing or cyanosis.  There is trace pedal edema noted.  PERIPHERAL VASCULAR:  Pulses present and equally palpable; 2+ throughout.  MUSCULOSKELETAL: Osteoarthritic changes  NEURO/PSYCH:  Alert and oriented times three with approppriate behavior and responses. Nonfocal motor examination with normal gait and ambulation  Lymph: No significant palpable lymphadenopathy  Skin: no rash or lesions on exposed skin or reported.    Lab:     CBC:   Lab Results   Component Value Date    WBC 6.2 02/21/2024    RBC 5.09 02/21/2024    HGB 14.8 02/21/2024    HCT 44.7 02/21/2024     02/21/2024        CMP:    Lab Results   Component Value Date     07/10/2024    K 4.8 07/10/2024     07/10/2024    CO2 26 07/10/2024    BUN 27 (H) 07/10/2024    CREATININE 1.55 (H) 07/10/2024    GLUCOSE 135 (H) 07/10/2024    CALCIUM 9.1 07/10/2024       Magnesium:    Lab Results   Component Value Date    MG 2.00 08/11/2021       Lipid Profile:    Lab  "Results   Component Value Date    TRIG 200 (H) 07/10/2024    HDL 32.0 07/10/2024    LDLCALC 72 07/10/2024       TSH:    Lab Results   Component Value Date    TSH 1.29 08/11/2021       BNP:   Lab Results   Component Value Date     (H) 04/19/2023        PT/INR:    No results found for: \"PROTIME\", \"INR\"    HgBA1c:    Lab Results   Component Value Date    HGBA1C 6.1 08/19/2024       BMP:  Lab Results   Component Value Date     07/10/2024     02/21/2024     04/19/2023     02/04/2022     (L) 02/03/2022    K 4.8 07/10/2024    K 4.7 02/21/2024    K 4.5 04/19/2023    K 4.4 02/04/2022    K 4.5 02/03/2022     07/10/2024     02/21/2024     (H) 04/19/2023     (H) 02/04/2022     02/03/2022    CO2 26 07/10/2024    CO2 26 02/21/2024    CO2 24 04/19/2023    CO2 26 02/04/2022    CO2 23 02/03/2022    BUN 27 (H) 07/10/2024    BUN 25 (H) 02/21/2024    BUN 23 04/19/2023    BUN 23 02/04/2022    BUN 24 (H) 02/03/2022    CREATININE 1.55 (H) 07/10/2024    CREATININE 1.38 (H) 02/21/2024    CREATININE 1.31 (H) 04/19/2023    CREATININE 1.12 02/04/2022    CREATININE 1.25 02/03/2022       Cardiac Enzymes:    No results found for: \"TROPHS\"    Hepatic Function Panel:    Lab Results   Component Value Date    ALKPHOS 94 07/10/2024    ALT 18 07/10/2024    AST 20 07/10/2024    PROT 6.3 (L) 07/10/2024    BILITOT 1.1 07/10/2024    BILIDIR 0.2 10/13/2022     Labs reviewed    Diagnostic Studies:     Transthoracic echo (TTE) complete    Result Date: 9/10/2024                  [facility] [id number], [address], [place] [address]            Tel [phone] Fax [phone] TRANSTHORACIC ECHOCARDIOGRAM REPORT Patient Name:      DHRUV Skinner Physician:    Angelo Masters MD Study Date:        9/9/2024              Ordering Provider:    Angelo CASEY"                                VARUN MRN/PID:           [id number]              Fellow: Accession#:        [id number]          Nurse: Date of Birth/Age: 1940 / 84 years  Sonographer:          Marisol Ordonez RDMS, AMIE, RVS Gender:            M                     Additional Staff: Height:            185.42 cm             Admit Date: Weight:            120.66 kg             Admission Status: BSA / BMI:         2.43 m2 / 35.09 kg/m2 Department Location:  [facility] Blood Pressure: 132 /74 mmHg Study Type:    TRANSTHORACIC ECHO (TTE) COMPLETE Diagnosis/ICD: Shortness of breath-R06.02 Indication:    SOB--OE, CAD/Stenting, Fatigue, HTN, HLD., Former smoker, H/o                MI/PCI CPT Codes:     Echo Complete w Full [id number]  Study Detail: The following Echo studies were performed: 2D, M-Mode, Doppler and               color flow.  PHYSICIAN INTERPRETATION: Left Ventricle: Left ventricular ejection fraction is mildly decreased, calculated by Watts's biplane at 50%. There are no regional wall motion abnormalities. The left ventricular cavity size is normal. There is moderate asymmetric left ventricular hypertrophy involving the septal wall. Spectral Doppler shows an impaired relaxation pattern of left ventricular diastolic filling. There is no definite left ventricular thrombus visualized. The intraventricular septum appears intact without evidence of shunting or a ventricular septal defect. Left Atrium: The left atrium is upper limits of normal in size. Right Ventricle: The right ventricle is normal in size. There is normal right ventricular global systolic function. Right Atrium: The right atrium is normal in size. Aortic Valve: The aortic valve is trileaflet. There is minimal aortic valve cusp calcification. There is mild aortic valve thickening. There is anterior  aortic valve annular calcification. There is no evidence of aortic valve stenosis. The aortic valve dimensionless index is 0.69. There is trace to mild aortic valve regurgitation. The peak instantaneous gradient of the aortic valve is 6.7 mmHg. The mean gradient of the aortic valve is 4.1 mmHg. Mitral Valve: The mitral valve is mild to moderately thickened. There is no evidence of mitral valve prolapse. There is no evidence of mitral valve stenosis. There is mild mitral annular calcification. There is trace mitral valve regurgitation. Tricuspid Valve: The tricuspid valve is structurally normal. There is no evidence of tricuspid valve stenosis. There is trace tricuspid regurgitation. The Doppler estimated RVSP is within normal limits at 11.2 mmHg. Pulmonic Valve: The pulmonic valve is not well visualized. There is no indication of pulmonic valve regurgitation. Pericardium: There is no pericardial effusion noted. There is an anterior clear space. Aorta: The aortic root is abnormal. There is no dilatation of the ascending aorta. There is mild dilatation of the aortic root. Systemic Veins: The inferior vena cava was not well visualized. In comparison to the previous echocardiogram(s): Compared with study dated 9/8/2021,. No significant change.  CONCLUSIONS:  1. Left ventricular ejection fraction is mildly decreased, calculated by Watts's biplane at 50%.  2. Spectral Doppler shows an impaired relaxation pattern of left ventricular diastolic filling.  3. Intact intraventricular septum without shunting or a ventricular septal defect.  4. There is moderate asymmetric left ventricular hypertrophy.  5. No left ventricular thrombus visualized.  6. There is normal right ventricular global systolic function.  7. No evidence of mitral valve prolapse.  8. There is No tricuspid stenosis.  9. Right ventricular within normal limits. 10. Aortic valve stenosis is not present. 11. No significant change. QUANTITATIVE DATA SUMMARY:  2D  MEASUREMENTS:           Normal Ranges: LAs:             3.86 cm   (2.7-4.0cm) RVIDd:           2.15 cm   (0.9-3.6cm) IVSd:            1.50 cm   (0.6-1.1cm) LVPWd:           1.05 cm   (0.6-1.1cm) LVIDd:           4.33 cm   (3.9-5.9cm) LVIDs:           3.03 cm LV Mass Index:   83.7 g/m2 LV % FS          30.1 %  LA VOLUME:                   Normal Ranges: LA Vol A4C:       49.7 ml    (22+/-6mL/m2) LA Vol A2C:       27.4 ml LA Vol BP:        37.1 ml LA Vol Index A4C: 20.5 ml/m2 LA Vol Index A2C: 11.3 ml/m2 LA Vol Index BP:  15.3 ml/m2 LA Vol A4C:       47.1 ml LA Vol A2C:       25.4 ml LA Vol Index BSA: 14.9 ml/m2  LV SYSTOLIC FUNCTION BY 2D PLANIMETRY (MOD):                      Normal Ranges: EF-A4C View:    54 % (>=55%) EF-A2C View:    41 % EF-Biplane:     50 % LV EF Reported: 50 %  LV DIASTOLIC FUNCTION:           Normal Ranges: MV Peak E:             0.63 m/s  (0.7-1.2 m/s) MV Peak A:             1.11 m/s  (0.42-0.7 m/s) E/A Ratio:             0.56      (1.0-2.2) MV e'                  0.054 m/s (>8.0) MV lateral e'          0.06 m/s MV medial e'           0.05 m/s E/e' Ratio:            11.68     (<8.0)  MITRAL VALVE:          Normal Ranges: MV DT:        299 msec (150-240msec)  AORTIC VALVE:                     Normal Ranges: AoV Vmax:                1.30 m/s (<=1.7m/s) AoV Peak P.7 mmHg (<20mmHg) AoV Mean P.1 mmHg (1.7-11.5mmHg) LVOT Max David:            0.91 m/s (<=1.1m/s) AoV VTI:                 30.61 cm (18-25cm) LVOT VTI:                20.99 cm LVOT Diameter:           2.40 cm  (1.8-2.4cm) AoV Area, VTI:           3.09 cm2 (2.5-5.5cm2) AoV Area,Vmax:           3.14 cm2 (2.5-4.5cm2) AoV Dimensionless Index: 0.69  RIGHT VENTRICLE: RV Major 7.7 cm  TRICUSPID VALVE/RVSP:          Normal Ranges: Peak TR Velocity:     1.24 m/s Est. RA Pressure:     5 mmHg RV Syst Pressure:     11 mmHg  (< 30mmHg)  PULMONIC VALVE:          Normal Ranges: PV Max David:     0.9 m/s  (0.6-0.9m/s) PV Max  PG:      3.3 mmHg  AORTA: Asc Ao Diam 3.62 cm  [id number] Chel Masters MD Electronically signed on 9/10/2024 at 9:09:27 PM  ** Final **    Echo reviewed  No nuclear medicine results found for the past 12 months    No valid procedures specified.    EKG:   EKG today - SR with sinus arrhythmia and first degree av block, RBBB, criteria for IWMI, criteria for AWMI.     Radiology:     No orders to display     ASSESSMENT     Problem List Items Addressed This Visit       Atherosclerosis of native coronary artery of native heart without angina pectoris    Essential hypertension, benign    Hyperlipidemia    Right bundle branch block - Primary    Relevant Orders    ECG 12 lead (Clinic Performed) (Completed)    Stage 3b chronic kidney disease (Multi)    Orthostatic hypotension       PLAN     1.  Orthostatic hypotension.  Patient has marked orthostatic hypotension.  We have asked him to frontloaded his fluids drinking more in the morning and early afternoon and less in the evening.  We have asked him to curtail his coffee intake.  Will recheck his orthostatic vital signs when he returns if he still orthostatic we will either add midodrine or Florinef to help him hold onto the salt and water that he is drinking.  2.  Coronary artery disease stable without angina pectoris continue conservative medical therapy.  3.  Benign essential hypertension.  Will not add any medications for the treatment of hypertension at this time given his severe orthostatic hypotension.  4.  Hyperlipidemia.  Last LDL cholesterol was at goal.  Continue his current statin therapy.  5.  Shortness of breath on exertion.  Not really that big of an issue at this time his echo showed lower normal LV function without any significant valvular abnormalities would follow him clinically.  6.  Right bundle branch block.  A chronic finding.    Will see the patient in follow-up in a month to reassess his orthostatics at that time.  Explained to the patient that I would  not be overly concerned about some borderline hypertension when he has such severe orthostatic hypotension we have to address 1 thing at a time is increasing his blood pressure medications will only make his standing blood pressures lower and put him at risk for syncope and falls.  Patient voices understanding.

## 2025-01-20 RX ORDER — RAMIPRIL 2.5 MG/1
2.5 CAPSULE ORAL DAILY
Qty: 90 CAPSULE | Refills: 1 | Status: SHIPPED | OUTPATIENT
Start: 2025-01-20

## 2025-01-28 ENCOUNTER — OFFICE VISIT (OUTPATIENT)
Dept: PRIMARY CARE | Facility: CLINIC | Age: 85
End: 2025-01-28
Payer: MEDICARE

## 2025-01-28 VITALS
HEART RATE: 72 BPM | DIASTOLIC BLOOD PRESSURE: 82 MMHG | BODY MASS INDEX: 35.12 KG/M2 | WEIGHT: 265 LBS | OXYGEN SATURATION: 94 % | SYSTOLIC BLOOD PRESSURE: 120 MMHG | HEIGHT: 73 IN

## 2025-01-28 DIAGNOSIS — M79.5 FOREIGN BODY (FB) IN SOFT TISSUE: Primary | ICD-10-CM

## 2025-01-28 DIAGNOSIS — E66.09 EXOGENOUS OBESITY: ICD-10-CM

## 2025-01-28 DIAGNOSIS — E78.2 MIXED HYPERLIPIDEMIA: ICD-10-CM

## 2025-01-28 DIAGNOSIS — T14.8XXA WOUND DISCHARGE: ICD-10-CM

## 2025-01-28 DIAGNOSIS — I10 ESSENTIAL HYPERTENSION, BENIGN: ICD-10-CM

## 2025-01-28 DIAGNOSIS — E11.9 TYPE 2 DIABETES MELLITUS WITHOUT COMPLICATION, WITHOUT LONG-TERM CURRENT USE OF INSULIN (MULTI): ICD-10-CM

## 2025-01-28 PROCEDURE — 87070 CULTURE OTHR SPECIMN AEROBIC: CPT

## 2025-01-28 PROCEDURE — 99213 OFFICE O/P EST LOW 20 MIN: CPT | Performed by: FAMILY MEDICINE

## 2025-01-28 PROCEDURE — G2211 COMPLEX E/M VISIT ADD ON: HCPCS | Performed by: FAMILY MEDICINE

## 2025-01-28 PROCEDURE — 87075 CULTR BACTERIA EXCEPT BLOOD: CPT

## 2025-01-28 PROCEDURE — 87070 CULTURE OTHR SPECIMN AEROBIC: CPT | Performed by: FAMILY MEDICINE

## 2025-01-28 PROCEDURE — 87205 SMEAR GRAM STAIN: CPT

## 2025-01-28 ASSESSMENT — ENCOUNTER SYMPTOMS
COUGH: 0
CHEST TIGHTNESS: 0
COLOR CHANGE: 0
STRIDOR: 0
CONSTITUTIONAL NEGATIVE: 1
DIARRHEA: 0
PHOTOPHOBIA: 0
DIZZINESS: 0
RECTAL PAIN: 0
SPEECH DIFFICULTY: 0
EYE PAIN: 0
POLYPHAGIA: 0
FEVER: 0
HEMATURIA: 0
TROUBLE SWALLOWING: 0
SHORTNESS OF BREATH: 0
FATIGUE: 0
MYALGIAS: 0
ACTIVITY CHANGE: 0
SINUS PAIN: 0
DYSURIA: 0
APPETITE CHANGE: 0
NERVOUS/ANXIOUS: 0
CONSTIPATION: 0
ARTHRALGIAS: 0
FLANK PAIN: 0
RHINORRHEA: 0
HEADACHES: 0
ABDOMINAL DISTENTION: 0
SEIZURES: 0
PALPITATIONS: 0
DYSPHORIC MOOD: 0
DECREASED CONCENTRATION: 0
SLEEP DISTURBANCE: 0
CONFUSION: 0
AGITATION: 0
BLOOD IN STOOL: 0
POLYDIPSIA: 0
SINUS PRESSURE: 0
ADENOPATHY: 0
ABDOMINAL PAIN: 0
NECK STIFFNESS: 0
SORE THROAT: 0

## 2025-01-28 NOTE — PROGRESS NOTES
"Subjective   Patient ID: Michael Light is a 84 y.o. male who presents for Toe Pain.    Toe Pain   Incident onset: 1.5 months ago. The incident occurred at home. The injury mechanism is unknown. The pain is present in the left toes. The quality of the pain is described as aching and stabbing. The pain is mild. The pain has been Constant since onset. It is unknown if a foreign body is present. The symptoms are aggravated by weight bearing and palpation.       Review of Systems   Constitutional: Negative.  Negative for activity change, appetite change, fatigue and fever.   HENT:  Negative for congestion, dental problem, ear discharge, ear pain, mouth sores, rhinorrhea, sinus pressure, sinus pain, sore throat, tinnitus and trouble swallowing.    Eyes:  Negative for photophobia, pain and visual disturbance.   Respiratory:  Negative for cough, chest tightness, shortness of breath and stridor.    Cardiovascular:  Negative for chest pain and palpitations.   Gastrointestinal:  Negative for abdominal distention, abdominal pain, blood in stool, constipation, diarrhea and rectal pain.   Endocrine: Negative for cold intolerance, heat intolerance, polydipsia, polyphagia and polyuria.   Genitourinary:  Negative for dysuria, flank pain, hematuria and urgency.   Musculoskeletal:  Negative for arthralgias, gait problem, myalgias and neck stiffness.   Skin:  Negative for color change and rash.   Allergic/Immunologic: Negative for environmental allergies and food allergies.   Neurological:  Negative for dizziness, seizures, syncope, speech difficulty and headaches.   Hematological:  Negative for adenopathy.   Psychiatric/Behavioral:  Negative for agitation, confusion, decreased concentration, dysphoric mood and sleep disturbance. The patient is not nervous/anxious.        Objective   /82 (BP Location: Right arm, Patient Position: Sitting, BP Cuff Size: Adult)   Pulse 72   Ht 1.854 m (6' 1\")   Wt 120 kg (265 lb)   SpO2 94%  "  BMI 34.96 kg/m²     Physical Exam  Vitals reviewed.   Constitutional:       General: He is not in acute distress.     Appearance: Normal appearance. He is obese. He is not ill-appearing or diaphoretic.   HENT:      Head: Normocephalic.      Right Ear: Tympanic membrane and external ear normal.      Left Ear: Tympanic membrane and external ear normal.      Nose: Nose normal. No congestion.      Mouth/Throat:      Pharynx: No posterior oropharyngeal erythema.   Eyes:      General:         Right eye: No discharge.         Left eye: No discharge.      Extraocular Movements: Extraocular movements intact.      Conjunctiva/sclera: Conjunctivae normal.      Pupils: Pupils are equal, round, and reactive to light.   Cardiovascular:      Rate and Rhythm: Normal rate and regular rhythm.      Pulses: Normal pulses.      Heart sounds: Normal heart sounds. No murmur heard.  Pulmonary:      Effort: Pulmonary effort is normal. No respiratory distress.      Breath sounds: Normal breath sounds. No wheezing or rales.   Chest:      Chest wall: No tenderness.   Abdominal:      General: Abdomen is flat. Bowel sounds are normal. There is no distension.      Palpations: There is no mass.      Tenderness: There is no abdominal tenderness. There is no guarding.   Musculoskeletal:         General: No tenderness. Normal range of motion.      Cervical back: Normal range of motion and neck supple. No tenderness.      Right lower leg: No edema.      Left lower leg: No edema.   Skin:     General: Skin is dry.      Coloration: Skin is not jaundiced.      Findings: No bruising, erythema or rash.   Neurological:      General: No focal deficit present.      Mental Status: He is alert and oriented to person, place, and time. Mental status is at baseline.      Cranial Nerves: No cranial nerve deficit.      Sensory: No sensory deficit.      Coordination: Coordination normal.      Gait: Gait normal.   Psychiatric:         Mood and Affect: Mood normal.          Thought Content: Thought content normal.         Judgment: Judgment normal.     Patient ID: Michael Light is a 84 y.o. male.    Foreign Body Removal    Date/Time: 1/28/2025 3:19 PM    Performed by: Goukl Sahu DO  Authorized by: Gokul Sahu DO    Consent:     Consent obtained:  Verbal    Consent given by:  Patient    Risks, benefits, and alternatives were discussed: yes      Risks discussed:  Incomplete removal, bleeding, pain and infection    Alternatives discussed:  No treatment  Universal protocol:     Procedure explained and questions answered to patient or proxy's satisfaction: yes      Site/side marked: yes      Immediately prior to procedure, a time out was called: yes      Patient identity confirmed:  Verbally with patient  Location:     Location:  Toe    Toe location:  L great toe    Tendon involvement:  None  Pre-procedure details:     Imaging:  None    Neurovascular status: intact      Preparation: Patient was prepped and draped in usual sterile fashion    Anesthesia:     Anesthesia method:  None  Procedure type:     Procedure complexity:  Simple  Procedure details:     Removal mechanism:  Hemostat    Foreign bodies recovered:  1    Intact foreign body removal: yes    Post-procedure details:     Neurovascular status: intact      Confirmation:  No additional foreign bodies on visualization    Skin closure:  None    Dressing:  Antibiotic ointment and adhesive bandage    Procedure completion:  Tolerated well, no immediate complications      Assessment/Plan   Problem List Items Addressed This Visit    None  Visit Diagnoses         Codes    Wound discharge     T14.8XXA    Relevant Orders    Tissue/Wound Culture/Smear          Scribe Attestation  By signing my name below, IKatie MA , Scribe   attest that this documentation has been prepared under the direction and in the presence of Gokul Sahu DO.    Provider Attestation - Scribe documentation    All medical record entries  made by the Scribe were at my direction and personally dictated by me. I have reviewed the chart and agree that the record accurately reflects my personal performance of the history, physical exam, discussion and plan.

## 2025-01-31 LAB
BACTERIA SPEC CULT: NORMAL
GRAM STN SPEC: NORMAL
GRAM STN SPEC: NORMAL

## 2025-02-04 ASSESSMENT — ENCOUNTER SYMPTOMS: WOUND: 1

## 2025-02-11 ENCOUNTER — HOSPITAL ENCOUNTER (OUTPATIENT)
Dept: RADIOLOGY | Facility: CLINIC | Age: 85
Discharge: HOME | End: 2025-02-11
Payer: MEDICARE

## 2025-02-11 ENCOUNTER — OFFICE VISIT (OUTPATIENT)
Dept: PRIMARY CARE | Facility: CLINIC | Age: 85
End: 2025-02-11
Payer: MEDICARE

## 2025-02-11 VITALS
HEIGHT: 73 IN | SYSTOLIC BLOOD PRESSURE: 124 MMHG | RESPIRATION RATE: 16 BRPM | BODY MASS INDEX: 35.25 KG/M2 | WEIGHT: 266 LBS | OXYGEN SATURATION: 98 % | HEART RATE: 82 BPM | DIASTOLIC BLOOD PRESSURE: 76 MMHG | TEMPERATURE: 97.7 F

## 2025-02-11 DIAGNOSIS — M25.532 LEFT WRIST PAIN: Primary | ICD-10-CM

## 2025-02-11 DIAGNOSIS — M25.532 LEFT WRIST PAIN: ICD-10-CM

## 2025-02-11 PROCEDURE — 73100 X-RAY EXAM OF WRIST: CPT | Mod: LEFT SIDE | Performed by: RADIOLOGY

## 2025-02-11 PROCEDURE — 73100 X-RAY EXAM OF WRIST: CPT | Mod: LT

## 2025-02-11 PROCEDURE — 99213 OFFICE O/P EST LOW 20 MIN: CPT | Performed by: NURSE PRACTITIONER

## 2025-02-11 ASSESSMENT — ENCOUNTER SYMPTOMS
TINGLING: 0
NUMBNESS: 0
ABDOMINAL PAIN: 0
JOINT SWELLING: 1
PALPITATIONS: 0
DIARRHEA: 0
COUGH: 0
HEADACHES: 0
CONSTIPATION: 0
DIZZINESS: 0
WEAKNESS: 0
VOMITING: 0
WHEEZING: 0
NAUSEA: 0
SHORTNESS OF BREATH: 0

## 2025-02-11 NOTE — PROGRESS NOTES
"Subjective   Patient ID: Michael Light is a 84 y.o. male who presents for Wrist Pain.    Patient presents in office with a complaint of wrist pain. Patient admits he has been experiencing a reoccurring pain in his left wrist. Patient admits while hunting about a month ago he tripped and fell. Patient admits at the time he had experienced swelling and pain. Patient applied Aspercreme which he feels minimal provided relief.     Wrist Injury   The incident occurred more than 1 week ago (1 month ago). The injury mechanism was a fall. The pain is present in the left wrist. The quality of the pain is described as aching. The pain does not radiate. The pain is moderate. The pain has been Intermittent since the incident. Pertinent negatives include no chest pain, numbness or tingling. Treatments tried: aspercreme. The treatment provided no relief.    He has been trying to rest the wrist, but states that his pain and swelling are persisting.     Review of Systems   Respiratory:  Negative for cough, shortness of breath and wheezing.    Cardiovascular:  Negative for chest pain and palpitations.   Gastrointestinal:  Negative for abdominal pain, constipation, diarrhea, nausea and vomiting.   Musculoskeletal:  Positive for joint swelling.   Skin:  Negative for rash.   Neurological:  Negative for dizziness, tingling, weakness, numbness and headaches.       Objective   /76 (BP Location: Right arm, Patient Position: Sitting, BP Cuff Size: Large adult)   Pulse 82   Temp 36.5 °C (97.7 °F) (Temporal)   Resp 16   Ht 1.854 m (6' 1\")   Wt 121 kg (266 lb)   SpO2 98%   BMI 35.09 kg/m²     Physical Exam  Vitals and nursing note reviewed.   Constitutional:       General: He is not in acute distress.     Appearance: Normal appearance.   Musculoskeletal:      Left wrist: Swelling and tenderness present. No deformity or snuff box tenderness. Normal range of motion. Normal pulse.   Skin:     General: Skin is warm and dry. "   Neurological:      Mental Status: He is alert.   Psychiatric:         Mood and Affect: Mood normal.         Behavior: Behavior normal.         Assessment/Plan   Problem List Items Addressed This Visit    None  Visit Diagnoses         Codes    Left wrist pain    -  Primary M25.532    Relevant Orders    XR wrist left 1-2 views        Rest the joint, encouraged wearing wrist brace for extra support.   Check xray  May take tylenol as needed for discomfort.   Follow up with PCP in 1 week with any persisting symptoms, or sooner with any worsening pain or any additional concerns.   If developing any severe pain, fever/chills, extremity weakness proceed to emergency department for further evaluation.

## 2025-02-14 NOTE — RESULT ENCOUNTER NOTE
Called and spoke with patient regarding his results. He states that he is no longer having any pain.

## 2025-02-17 ENCOUNTER — APPOINTMENT (OUTPATIENT)
Dept: CARDIOLOGY | Facility: CLINIC | Age: 85
End: 2025-02-17
Payer: MEDICARE

## 2025-02-17 VITALS
BODY MASS INDEX: 35.52 KG/M2 | DIASTOLIC BLOOD PRESSURE: 76 MMHG | HEIGHT: 73 IN | SYSTOLIC BLOOD PRESSURE: 120 MMHG | WEIGHT: 268 LBS | HEART RATE: 56 BPM

## 2025-02-17 DIAGNOSIS — R42 ORTHOSTATIC LIGHTHEADEDNESS: ICD-10-CM

## 2025-02-17 DIAGNOSIS — R06.02 SOB (SHORTNESS OF BREATH): ICD-10-CM

## 2025-02-17 DIAGNOSIS — N18.32 STAGE 3B CHRONIC KIDNEY DISEASE (MULTI): Primary | ICD-10-CM

## 2025-02-17 DIAGNOSIS — I10 ESSENTIAL HYPERTENSION, BENIGN: ICD-10-CM

## 2025-02-17 DIAGNOSIS — I95.1 ORTHOSTATIC HYPOTENSION: ICD-10-CM

## 2025-02-17 DIAGNOSIS — R06.02 SHORTNESS OF BREATH ON EXERTION: ICD-10-CM

## 2025-02-17 DIAGNOSIS — I25.10 ATHEROSCLEROSIS OF NATIVE CORONARY ARTERY OF NATIVE HEART WITHOUT ANGINA PECTORIS: ICD-10-CM

## 2025-02-17 PROCEDURE — 1159F MED LIST DOCD IN RCRD: CPT | Performed by: INTERNAL MEDICINE

## 2025-02-17 PROCEDURE — 1160F RVW MEDS BY RX/DR IN RCRD: CPT | Performed by: INTERNAL MEDICINE

## 2025-02-17 PROCEDURE — 99214 OFFICE O/P EST MOD 30 MIN: CPT | Performed by: INTERNAL MEDICINE

## 2025-02-17 PROCEDURE — 3074F SYST BP LT 130 MM HG: CPT | Performed by: INTERNAL MEDICINE

## 2025-02-17 PROCEDURE — G2211 COMPLEX E/M VISIT ADD ON: HCPCS | Performed by: INTERNAL MEDICINE

## 2025-02-17 PROCEDURE — 1157F ADVNC CARE PLAN IN RCRD: CPT | Performed by: INTERNAL MEDICINE

## 2025-02-17 PROCEDURE — 3078F DIAST BP <80 MM HG: CPT | Performed by: INTERNAL MEDICINE

## 2025-02-17 PROCEDURE — 1036F TOBACCO NON-USER: CPT | Performed by: INTERNAL MEDICINE

## 2025-02-17 PROCEDURE — 1123F ACP DISCUSS/DSCN MKR DOCD: CPT | Performed by: INTERNAL MEDICINE

## 2025-02-17 ASSESSMENT — ENCOUNTER SYMPTOMS
PALPITATIONS: 0
COUGH: 0
LIGHT-HEADEDNESS: 1
SHORTNESS OF BREATH: 1
WHEEZING: 0
STRIDOR: 0
ARTHRALGIAS: 1
PSYCHIATRIC NEGATIVE: 1

## 2025-02-17 NOTE — PROGRESS NOTES
"  Patient:  Michael Light  YOB: 1940  MRN: [id number]       Chief Complaint/Active Symptoms:       Michael Light is a 84 y.o. male who returns today for cardiac follow-up.    Is here for follow-up of orthostatic hypotension. Is not drinking 2 liters of water a day. Still has some orthostatic lightheadedness although he has improved. Home BP is usually in the 130's at most at home.     He has noted his urine is lighter in color and although he has some chronic shortness of breath on exertion he has not noted any change or worsening or lower extremity edema.    Review of Systems   Respiratory:  Positive for shortness of breath. Negative for cough, wheezing and stridor.    Cardiovascular:  Negative for chest pain, palpitations and leg swelling.   Musculoskeletal:  Positive for arthralgias.   Neurological:  Positive for light-headedness. Negative for syncope.   Psychiatric/Behavioral: Negative.     All other systems reviewed and are negative.      Objective:     Vitals:    02/17/25 0953   BP: 144/80   Pulse: 56       Vitals:    02/17/25 0953   Weight: 122 kg (268 lb)     Vitals:    02/17/25 0953 02/17/25 1022 02/17/25 1023   BP: 144/80 142/78 120/76   BP Location: Left arm Left arm Left arm   Patient Position: Sitting Lying Standing   BP Cuff Size:  Large adult Large adult   Pulse: 56     Weight: 122 kg (268 lb)     Height: 1.854 m (6' 1\")        Allergies:     No Known Allergies       Medications:     Current Outpatient Medications   Medication Instructions    atorvastatin (LIPITOR) 80 mg, oral, Daily    clopidogrel (PLAVIX) 75 mg, oral, Daily    fish oil concentrate (Omega-3) 120-180 mg capsule Take by mouth.    metoprolol succinate XL (TOPROL-XL) 25 mg, oral, Daily    nitroglycerin (NITROSTAT) 0.4 mg, Every 5 min PRN    omeprazole OTC (PRILOSEC OTC) 20 mg, oral, Daily, Do not crush, chew, or split.    ramipril (ALTACE) 2.5 mg, oral, Daily       Physical Examination:   GENERAL:  Well developed, well " "nourished, in no acute distress.  HEENT: NC AT, EOMI with anicteric sclera  NECK:  Supple, no JVD, no bruit.  CHEST:  Symmetric and nontender.  LUNGS: Nonlabored respirations with rhonchi and bibasilar rales that only partially clear with cough   HEART: PMI is not palpable.  There is a regular rhythm with a normal S1 and S2 without S3.  Soft systolic ejection murmur on the left precordium.  ABDOMEN: Soft, NT, ND without palpable organomegaly or bruits  EXTREMITIES:  Warm with good color, no clubbing or cyanosis.  There is no edema noted.  PERIPHERAL VASCULAR:  Pulses present and equally palpable; 2+ throughout.  MUSCULOSKELETAL: Osteoarthritic changes  NEURO/PSYCH:  Alert and oriented times three with approppriate behavior and responses. Nonfocal motor examination with normal gait and ambulation      Lab:     CBC:   Lab Results   Component Value Date    WBC 6.2 02/21/2024    RBC 5.09 02/21/2024    HGB 14.8 02/21/2024    HCT 44.7 02/21/2024     02/21/2024        CMP:    Lab Results   Component Value Date     07/10/2024    K 4.8 07/10/2024     07/10/2024    CO2 26 07/10/2024    BUN 27 (H) 07/10/2024    CREATININE 1.55 (H) 07/10/2024    GLUCOSE 135 (H) 07/10/2024    CALCIUM 9.1 07/10/2024       Magnesium:    Lab Results   Component Value Date    MG 2.00 08/11/2021       Lipid Profile:    Lab Results   Component Value Date    TRIG 200 (H) 07/10/2024    HDL 32.0 07/10/2024    LDLCALC 72 07/10/2024       TSH:    Lab Results   Component Value Date    TSH 1.29 08/11/2021       BNP:   Lab Results   Component Value Date     (H) 04/19/2023        PT/INR:    No results found for: \"PROTIME\", \"INR\"    HgBA1c:    Lab Results   Component Value Date    HGBA1C 6.1 08/19/2024       BMP:  Lab Results   Component Value Date     07/10/2024     02/21/2024     04/19/2023     02/04/2022     (L) 02/03/2022    K 4.8 07/10/2024    K 4.7 02/21/2024    K 4.5 04/19/2023    K 4.4 02/04/2022 " "   K 4.5 02/03/2022     07/10/2024     02/21/2024     (H) 04/19/2023     (H) 02/04/2022     02/03/2022    CO2 26 07/10/2024    CO2 26 02/21/2024    CO2 24 04/19/2023    CO2 26 02/04/2022    CO2 23 02/03/2022    BUN 27 (H) 07/10/2024    BUN 25 (H) 02/21/2024    BUN 23 04/19/2023    BUN 23 02/04/2022    BUN 24 (H) 02/03/2022    CREATININE 1.55 (H) 07/10/2024    CREATININE 1.38 (H) 02/21/2024    CREATININE 1.31 (H) 04/19/2023    CREATININE 1.12 02/04/2022    CREATININE 1.25 02/03/2022       Cardiac Enzymes:    No results found for: \"TROPHS\"    Hepatic Function Panel:    Lab Results   Component Value Date    ALKPHOS 94 07/10/2024    ALT 18 07/10/2024    AST 20 07/10/2024    PROT 6.3 (L) 07/10/2024    BILITOT 1.1 07/10/2024    BILIDIR 0.2 10/13/2022         Diagnostic Studies:     Transthoracic echo (TTE) complete    Result Date: 9/10/2024                  [address], [place] [address]            Tel [phone] Fax [phone] TRANSTHORACIC ECHOCARDIOGRAM REPORT Patient Name:      DHRUV Skinner Physician:    [id number] Chel Masters MD Study Date:        9/9/2024              Ordering Provider:    Angelo MASTERS MRN/PID:           [id number]              Fellow: Accession#:        [id number]          Nurse: Date of Birth/Age: 1940 / 84 years  Sonographer:          AMIE Anders RDMS, RVS Gender:            M                     Additional Staff: Height:            185.42 cm             Admit Date: Weight:            120.66 kg             Admission Status: BSA / BMI:         2.43 m2 / 35.09 kg/m2 Department Location:  [facility]" Pressure: 132 /74 mmHg Study Type:    TRANSTHORACIC ECHO (TTE) COMPLETE Diagnosis/ICD: Shortness of breath-R06.02 Indication:    SOB--OE, CAD/Stenting, Fatigue, HTN, HLD., Former smoker, H/o                MI/PCI CPT Codes:     Echo Complete w Full [id number]  Study Detail: The following Echo studies were performed: 2D, M-Mode, Doppler and               color flow.  PHYSICIAN INTERPRETATION: Left Ventricle: Left ventricular ejection fraction is mildly decreased, calculated by Watts's biplane at 50%. There are no regional wall motion abnormalities. The left ventricular cavity size is normal. There is moderate asymmetric left ventricular hypertrophy involving the septal wall. Spectral Doppler shows an impaired relaxation pattern of left ventricular diastolic filling. There is no definite left ventricular thrombus visualized. The intraventricular septum appears intact without evidence of shunting or a ventricular septal defect. Left Atrium: The left atrium is upper limits of normal in size. Right Ventricle: The right ventricle is normal in size. There is normal right ventricular global systolic function. Right Atrium: The right atrium is normal in size. Aortic Valve: The aortic valve is trileaflet. There is minimal aortic valve cusp calcification. There is mild aortic valve thickening. There is anterior aortic valve annular calcification. There is no evidence of aortic valve stenosis. The aortic valve dimensionless index is 0.69. There is trace to mild aortic valve regurgitation. The peak instantaneous gradient of the aortic valve is 6.7 mmHg. The mean gradient of the aortic valve is 4.1 mmHg. Mitral Valve: The mitral valve is mild to moderately thickened. There is no evidence of mitral valve prolapse. There is no evidence of mitral valve stenosis. There is mild mitral annular calcification. There is trace mitral valve regurgitation. Tricuspid Valve: The tricuspid valve is structurally normal. There is no evidence  of tricuspid valve stenosis. There is trace tricuspid regurgitation. The Doppler estimated RVSP is within normal limits at 11.2 mmHg. Pulmonic Valve: The pulmonic valve is not well visualized. There is no indication of pulmonic valve regurgitation. Pericardium: There is no pericardial effusion noted. There is an anterior clear space. Aorta: The aortic root is abnormal. There is no dilatation of the ascending aorta. There is mild dilatation of the aortic root. Systemic Veins: The inferior vena cava was not well visualized. In comparison to the previous echocardiogram(s): Compared with study dated 9/8/2021,. No significant change.  CONCLUSIONS:  1. Left ventricular ejection fraction is mildly decreased, calculated by Watts's biplane at 50%.  2. Spectral Doppler shows an impaired relaxation pattern of left ventricular diastolic filling.  3. Intact intraventricular septum without shunting or a ventricular septal defect.  4. There is moderate asymmetric left ventricular hypertrophy.  5. No left ventricular thrombus visualized.  6. There is normal right ventricular global systolic function.  7. No evidence of mitral valve prolapse.  8. There is No tricuspid stenosis.  9. Right ventricular within normal limits. 10. Aortic valve stenosis is not present. 11. No significant change. QUANTITATIVE DATA SUMMARY:  2D MEASUREMENTS:           Normal Ranges: LAs:             3.86 cm   (2.7-4.0cm) RVIDd:           2.15 cm   (0.9-3.6cm) IVSd:            1.50 cm   (0.6-1.1cm) LVPWd:           1.05 cm   (0.6-1.1cm) LVIDd:           4.33 cm   (3.9-5.9cm) LVIDs:           3.03 cm LV Mass Index:   83.7 g/m2 LV % FS          30.1 %  LA VOLUME:                   Normal Ranges: LA Vol A4C:       49.7 ml    (22+/-6mL/m2) LA Vol A2C:       27.4 ml LA Vol BP:        37.1 ml LA Vol Index A4C: 20.5 ml/m2 LA Vol Index A2C: 11.3 ml/m2 LA Vol Index BP:  15.3 ml/m2 LA Vol A4C:       47.1 ml LA Vol A2C:       25.4 ml LA Vol Index BSA: 14.9 ml/m2  LV  "SYSTOLIC FUNCTION BY 2D PLANIMETRY (MOD):                      Normal Ranges: EF-A4C View:    54 % (>=55%) EF-A2C View:    41 % EF-Biplane:     50 % LV EF Reported: 50 %  LV DIASTOLIC FUNCTION:           Normal Ranges: MV Peak E:             0.63 m/s  (0.7-1.2 m/s) MV Peak A:             1.11 m/s  (0.42-0.7 m/s) E/A Ratio:             0.56      (1.0-2.2) MV e'                  0.054 m/s (>8.0) MV lateral e'          0.06 m/s MV medial e'           0.05 m/s E/e' Ratio:            11.68     (<8.0)  MITRAL VALVE:          Normal Ranges: MV DT:        299 msec (150-240msec)  AORTIC VALVE:                     Normal Ranges: AoV Vmax:                1.30 m/s (<=1.7m/s) AoV Peak P.7 mmHg (<20mmHg) AoV Mean P.1 mmHg (1.7-11.5mmHg) LVOT Max David:            0.91 m/s (<=1.1m/s) AoV VTI:                 30.61 cm (18-25cm) LVOT VTI:                20.99 cm LVOT Diameter:           2.40 cm  (1.8-2.4cm) AoV Area, VTI:           3.09 cm2 (2.5-5.5cm2) AoV Area,Vmax:           3.14 cm2 (2.5-4.5cm2) AoV Dimensionless Index: 0.69  RIGHT VENTRICLE: RV Major 7.7 cm  TRICUSPID VALVE/RVSP:          Normal Ranges: Peak TR Velocity:     1.24 m/s Est. RA Pressure:     5 mmHg RV Syst Pressure:     11 mmHg  (< 30mmHg)  PULMONIC VALVE:          Normal Ranges: PV Max David:     0.9 m/s  (0.6-0.9m/s) PV Max PG:      3.3 mmHg  AORTA: Asc Ao Diam 3.62 cm  [id number] Chel Masters MD Electronically signed on 9/10/2024 at 9:09:27 PM  ** Final **      No nuclear medicine results found for the past 12 months    No valid procedures specified.    EKG:   No results found for: \"EKG\"    Radiology:     No orders to display         ASSESSMENT     Problem List Items Addressed This Visit       Atherosclerosis of native coronary artery of native heart without angina pectoris    Essential hypertension, benign    Relevant Orders    Basic Metabolic Panel    B-Type Natriuretic Peptide    CBC and Auto Differential    Stage 3b chronic kidney " disease (Multi) - Primary    Relevant Orders    Basic Metabolic Panel    CBC and Auto Differential    Shortness of breath on exertion    Orthostatic hypotension    Relevant Orders    Basic Metabolic Panel    Orthostatic lightheadedness     Other Visit Diagnoses       SOB (shortness of breath)        Relevant Orders    B-Type Natriuretic Peptide            PLAN     1.  Orthostatic hypotension orthostatic lightheadedness.  With decreasing his medications and increasing his fluid intake the patient has still orthostatic but he is no longer hypotensive.  He still having the orthostatic lightheadedness.  With the crackles heard in his lungs we will hold at his current spot.  We will not add fludrocortisone to make him retain salt and water but would just watch him clinically.  2.  Shortness of breath with abnormal pulmonary examination.  Will check a BMP and BN peptide to make sure there is no signs of heart failure.  We have to find a balance because appropriate hydration obviously is keeping him from being hypotensive and if not eradicating improving his symptoms of orthostatic lightheadedness.  3.  Coronary artery disease stable without angina pectoris continue conservative medical therapy.  4.  Hypertension.  Again we are allowing permissive hypertension so that his standing blood pressure readings are not hypotensive and putting him at risk for syncope or falls.    Will await the results of his labs.  If there is no significant elevated BN peptide would follow him clinically and see him in 6 months.  If there is any worsening changes we will have to see him on a more regular basis.

## 2025-02-20 LAB
ANION GAP SERPL CALCULATED.4IONS-SCNC: 12 MMOL/L (CALC) (ref 7–17)
BASOPHILS # BLD AUTO: 57 CELLS/UL (ref 0–200)
BASOPHILS NFR BLD AUTO: 0.7 %
BNP SERPL-MCNC: 58 PG/ML
BUN SERPL-MCNC: 20 MG/DL (ref 7–25)
BUN/CREAT SERPL: 14 (CALC) (ref 6–22)
CALCIUM SERPL-MCNC: 9.1 MG/DL (ref 8.6–10.3)
CHLORIDE SERPL-SCNC: 105 MMOL/L (ref 98–110)
CO2 SERPL-SCNC: 20 MMOL/L (ref 20–32)
CREAT SERPL-MCNC: 1.48 MG/DL (ref 0.7–1.22)
EGFRCR SERPLBLD CKD-EPI 2021: 47 ML/MIN/1.73M2
EOSINOPHIL # BLD AUTO: 235 CELLS/UL (ref 15–500)
EOSINOPHIL NFR BLD AUTO: 2.9 %
ERYTHROCYTE [DISTWIDTH] IN BLOOD BY AUTOMATED COUNT: 13.1 % (ref 11–15)
GLUCOSE SERPL-MCNC: 143 MG/DL (ref 65–99)
HCT VFR BLD AUTO: 44.6 % (ref 38.5–50)
HGB BLD-MCNC: 14.9 G/DL (ref 13.2–17.1)
LYMPHOCYTES # BLD AUTO: 2074 CELLS/UL (ref 850–3900)
LYMPHOCYTES NFR BLD AUTO: 25.6 %
MCH RBC QN AUTO: 28.4 PG (ref 27–33)
MCHC RBC AUTO-ENTMCNC: 33.4 G/DL (ref 32–36)
MCV RBC AUTO: 85.1 FL (ref 80–100)
MONOCYTES # BLD AUTO: 624 CELLS/UL (ref 200–950)
MONOCYTES NFR BLD AUTO: 7.7 %
NEUTROPHILS # BLD AUTO: 5111 CELLS/UL (ref 1500–7800)
NEUTROPHILS NFR BLD AUTO: 63.1 %
PLATELET # BLD AUTO: 171 THOUSAND/UL (ref 140–400)
PMV BLD REES-ECKER: 11.1 FL (ref 7.5–12.5)
POTASSIUM SERPL-SCNC: 4.6 MMOL/L (ref 3.5–5.3)
RBC # BLD AUTO: 5.24 MILLION/UL (ref 4.2–5.8)
SODIUM SERPL-SCNC: 137 MMOL/L (ref 135–146)
WBC # BLD AUTO: 8.1 THOUSAND/UL (ref 3.8–10.8)

## 2025-03-05 ENCOUNTER — TELEPHONE (OUTPATIENT)
Dept: CARDIOLOGY | Facility: CLINIC | Age: 85
End: 2025-03-05
Payer: MEDICARE

## 2025-03-05 NOTE — TELEPHONE ENCOUNTER
----- Message from Chel Masters sent at 3/5/2025  1:40 PM EST -----  Labs look good. Mild renal insufficiency, no evidence for heart failure. No change in current medications based on these labs.  ----- Message -----  From: [employer] Results In  Sent: 2/19/2025  10:30 PM EST  To: Chel Masters MD

## 2025-03-17 ENCOUNTER — OFFICE VISIT (OUTPATIENT)
Dept: PRIMARY CARE | Facility: CLINIC | Age: 85
End: 2025-03-17
Payer: MEDICARE

## 2025-03-17 VITALS
WEIGHT: 269.6 LBS | DIASTOLIC BLOOD PRESSURE: 82 MMHG | OXYGEN SATURATION: 96 % | SYSTOLIC BLOOD PRESSURE: 136 MMHG | TEMPERATURE: 97.6 F | HEART RATE: 61 BPM | RESPIRATION RATE: 16 BRPM | BODY MASS INDEX: 35.73 KG/M2 | HEIGHT: 73 IN

## 2025-03-17 DIAGNOSIS — R05.9 COUGH IN ADULT PATIENT: ICD-10-CM

## 2025-03-17 DIAGNOSIS — J34.89 NASAL CONGESTION WITH RHINORRHEA: ICD-10-CM

## 2025-03-17 DIAGNOSIS — R09.81 NASAL CONGESTION WITH RHINORRHEA: ICD-10-CM

## 2025-03-17 DIAGNOSIS — E66.01 CLASS 2 SEVERE OBESITY WITH SERIOUS COMORBIDITY AND BODY MASS INDEX (BMI) OF 35.0 TO 35.9 IN ADULT, UNSPECIFIED OBESITY TYPE: ICD-10-CM

## 2025-03-17 DIAGNOSIS — E66.812 CLASS 2 SEVERE OBESITY WITH SERIOUS COMORBIDITY AND BODY MASS INDEX (BMI) OF 35.0 TO 35.9 IN ADULT, UNSPECIFIED OBESITY TYPE: ICD-10-CM

## 2025-03-17 DIAGNOSIS — J00 NASOPHARYNGITIS: Primary | ICD-10-CM

## 2025-03-17 PROCEDURE — 99212 OFFICE O/P EST SF 10 MIN: CPT | Performed by: NURSE PRACTITIONER

## 2025-03-17 RX ORDER — AMOXICILLIN 875 MG/1
875 TABLET, FILM COATED ORAL 2 TIMES DAILY
Qty: 14 TABLET | Refills: 0 | Status: SHIPPED | OUTPATIENT
Start: 2025-03-17 | End: 2025-03-22 | Stop reason: SDUPTHER

## 2025-03-17 ASSESSMENT — PATIENT HEALTH QUESTIONNAIRE - PHQ9
SUM OF ALL RESPONSES TO PHQ9 QUESTIONS 1 AND 2: 0
2. FEELING DOWN, DEPRESSED OR HOPELESS: NOT AT ALL
SUM OF ALL RESPONSES TO PHQ9 QUESTIONS 1 AND 2: 0
2. FEELING DOWN, DEPRESSED OR HOPELESS: NOT AT ALL
1. LITTLE INTEREST OR PLEASURE IN DOING THINGS: NOT AT ALL
1. LITTLE INTEREST OR PLEASURE IN DOING THINGS: NOT AT ALL

## 2025-03-17 NOTE — PROGRESS NOTES
Subjective   Patient ID: Michael Light is a 84 y.o. male who is with a chief complaint of symptoms of respiratory tract infection.     HPI   Patient is a 84 y.o. male who CONSULTED AT [facility] today. Patient is with complaints of nasal congestion, nasal discharge, headache, sinus pain, throat irritation, post nasal drip, productive cough, mild fatigue, and mild muscle ache. He denies having any loss of sense of taste, loss of sense of smell, diarrhea, chills nor fever. Patient states that present condition started about 5 days ago after being exposed to other patients in the waiting room of a dental clinic. he denies shortness of breath, chest pain, palpitations, nor edema. he stated that he  tried OTC medications which afforded only slight relief of symptoms. he denies nausea, vomiting, abdominal pain, nor any other symptoms.    Patient states he had his COVID vaccine.  Patient states he had the flu shot for this season.    Review of Systems  General: no weight loss, generally healthy, (+) mild fatigue  Head: (+) headache, (+) sinus pain, no vertigo, no injury  Eyes: no diplopia, no tearing, no pain,   Ears: no change in hearing, no tinnitus, no bleeding, no vertigo  Mouth:  no dental difficulties, no gingival bleeding, (+) throat irritation, no loss of sense of taste, (+) post nasal drip,   Nose: (+) congestion, (+) discharge, no bleeding, no obstruction, no loss of sense of smell  Neck: no stiffness, no pain, no tenderness, no masses, no bruit  Pulmonary: no dyspnea, no wheezing, no hemoptysis, (+) productive cough  Cardiovascular: no chest pain, no palpitations, no syncope, no orthopnea  Gastrointestinal: no change in appetite, no dysphagia, no abdominal pains, no diarrhea, no emesis, no melena  Genito Urinary: no dysuria, no urinary urgency, no nocturia, no incontinence, no change in nature of urine  Musculoskeletal: (+) muscle ache, no joint pain, no limitation of range of motion,  "no paresthesia, no numbness  Constitutional: no fever, no chills, no night sweats    Objective   /82 Comment: auto  Pulse 61   Temp 36.4 °C (97.6 °F)   Resp 16   Ht 1.854 m (6' 1\")   Wt 122 kg (269 lb 9.6 oz)   SpO2 96%   BMI 35.57 kg/m²     Physical Exam  General: ambulatory, in no acute distress  Head: normocephalic, no lesions, no sinus tenderness  Eyes: pink palpebral conjunctiva, anicteric sclerae, PERRLA, EOM's full  Ears: clear external auditory canals, no ear discharge, no bleeding from the ears, tympanic membrane intact  Nose: (+) congested nasal mucosa, (+) yellow mucoid nasal discharge, no bleeding, no obstruction  Throat: (+) erythema, and (+) exudate on posterior pharyngeal wall, no lesion  Neck: supple, no masses, no bruits, no CLADP  Chest: symmetrical chest expansion, no lagging, no retractions, clear breath sounds, no rales, no wheezes    Assessment/Plan   Problem List Items Addressed This Visit             ICD-10-CM    BMI 35.0-35.9,adult Z68.35     Other Visit Diagnoses         Codes    Nasopharyngitis    -  Primary J00    Relevant Medications    amoxicillin (Amoxil) 875 mg tablet    Nasal congestion with rhinorrhea     R09.81, J34.89    Relevant Medications    amoxicillin (Amoxil) 875 mg tablet    Cough in adult patient     R05.9    Relevant Medications    amoxicillin (Amoxil) 875 mg tablet    Class 2 severe obesity with serious comorbidity and body mass index (BMI) of 35.0 to 35.9 in adult, unspecified obesity type     E66.812, E66.01, Z68.35        DISCHARGE SUMMARY:   Patient was seen and examined. Diagnosis, treatment, treatment options, and possible complications of today's illness discussed and explained to patient. Patient to take medication/s associated with this visit. Patient may take OTC decongestant of choice as needed. Patient may also take OTC analgesic/ antipyretic if needed for pain/fever. Advised to increase oral fluid intake. Advised steam inhalation if needed to " relieve congestion. Advised warm saline gargle if needed to relieve throat discomfort. Advised Listerine antiseptic mouthwash gargle TID. Patient may use Cepacol oral spray as needed to relieve throat discomfort. Patient was advised to discard the old toothbrush and use a new toothbrush beginning on the third of antibiotics. Advised to come back if with worsening or persistent symptoms. Patient verbalized understanding of plan of care.    Patient to come back in 7 - 10 days if needed for worsening symptoms.

## 2025-03-17 NOTE — PROGRESS NOTES
"Subjective   Patient ID: Michael Light is a 84 y.o. male who presents for URI.        Symptoms: runny nose, stuffy, runny  nose, congestion,  headaches, slight sputum clear don't produce much, cough, slight sore throat.  Length of symptoms: 5 days ago  OTC: tylenol with mild help  Related information:    HPI     Review of Systems    Objective   /82 Comment: auto  Pulse 61   Temp 36.4 °C (97.6 °F)   Resp 16   Ht 1.854 m (6' 1\")   Wt 122 kg (269 lb 9.6 oz)   SpO2 96%   BMI 35.57 kg/m²     Physical Exam    Assessment/Plan          "

## 2025-03-18 ASSESSMENT — ENCOUNTER SYMPTOMS
DEPRESSION: 0
OCCASIONAL FEELINGS OF UNSTEADINESS: 1
LOSS OF SENSATION IN FEET: 0

## 2025-03-22 ENCOUNTER — DOCUMENTATION (OUTPATIENT)
Dept: PRIMARY CARE | Facility: CLINIC | Age: 85
End: 2025-03-22
Payer: MEDICARE

## 2025-03-22 DIAGNOSIS — J00 NASOPHARYNGITIS: ICD-10-CM

## 2025-03-22 DIAGNOSIS — R05.9 COUGH IN ADULT PATIENT: ICD-10-CM

## 2025-03-22 DIAGNOSIS — R09.81 NASAL CONGESTION WITH RHINORRHEA: ICD-10-CM

## 2025-03-22 DIAGNOSIS — J34.89 NASAL CONGESTION WITH RHINORRHEA: ICD-10-CM

## 2025-03-22 RX ORDER — AMOXICILLIN 875 MG/1
875 TABLET, FILM COATED ORAL 2 TIMES DAILY
Qty: 14 TABLET | Refills: 0 | Status: SHIPPED | OUTPATIENT
Start: 2025-03-22 | End: 2025-03-29

## 2025-03-22 RX ORDER — AMOXICILLIN 875 MG/1
875 TABLET, FILM COATED ORAL 2 TIMES DAILY
Qty: 14 TABLET | Refills: 0 | Status: SHIPPED | OUTPATIENT
Start: 2025-03-22 | End: 2025-03-22 | Stop reason: SDUPTHER

## 2025-04-10 DIAGNOSIS — I25.10 ATHEROSCLEROSIS OF NATIVE CORONARY ARTERY OF NATIVE HEART WITHOUT ANGINA PECTORIS: ICD-10-CM

## 2025-04-11 RX ORDER — CLOPIDOGREL BISULFATE 75 MG/1
75 TABLET ORAL DAILY
Qty: 90 TABLET | Refills: 3 | Status: SHIPPED | OUTPATIENT
Start: 2025-04-11

## 2025-06-26 ENCOUNTER — OFFICE VISIT (OUTPATIENT)
Dept: ORTHOPEDIC SURGERY | Facility: CLINIC | Age: 85
End: 2025-06-26
Payer: MEDICARE

## 2025-06-26 DIAGNOSIS — M72.0 DUPUYTREN'S CONTRACTURE: Primary | ICD-10-CM

## 2025-06-26 PROCEDURE — 1159F MED LIST DOCD IN RCRD: CPT | Performed by: ORTHOPAEDIC SURGERY

## 2025-06-26 PROCEDURE — 1036F TOBACCO NON-USER: CPT | Performed by: ORTHOPAEDIC SURGERY

## 2025-06-26 PROCEDURE — 99212 OFFICE O/P EST SF 10 MIN: CPT | Performed by: ORTHOPAEDIC SURGERY

## 2025-06-26 PROCEDURE — 99204 OFFICE O/P NEW MOD 45 MIN: CPT | Performed by: ORTHOPAEDIC SURGERY

## 2025-06-26 NOTE — PROGRESS NOTES
"    6/26/2025    Chief Complaint   Patient presents with    Left Hand - Trigger Finger, New Patient Visit     Ring and small finger triggers       History of Present Illness:  Patient Michael Light , 84 y.o. male, presents today, 6/26/2025, for evaluation of left ring finger and little finger deformity.  He states that he has had a chronic contracture of the digits for many years.  Over the last year or 2 he feels that functionally things have worsened.  He has deformity across the MCP resulting in the fingers remaining chronically flexed.  He denies family history of known Dupuytren's.  He states he has some coworkers that had surgery 4 years ago before his [facility] and they \"did horrible\".  He was interested in and what new options might be available however.  No injury or trauma associated symptom onset.  He is largely left-handed but has a high degree of ambidextrous abilities.  He does take Plavix.  History of type 2 diabetes, chronic kidney disease, coronary artery disease and hypertension.       Review of Systems:   GENERAL: Negative  GI: Negative  MUSCULOSKELETAL: See HPI  SKIN: Negative  NEURO:  Negative     Physical Exam:  GENERAL:  Alert and oriented to person, place, and time.  No acute distress and breathing comfortably; pleasant and cooperative with the examination.  HEENT:  Head is normocephalic and atraumatic.  NECK:  Supple, no visible swelling.  CARDIOVASCULAR:  No palpable tachycardia.  LUNGS:  No audible wheezing or labored breathing.  ABDOMEN:  Nondistended.  Extremities: Evaluation of left upper extremity finds the patient to have a palpable radial artery at the wrist with brisk capillary refill to all digits. The patient has intact sensorium to axillary, radial, median and ulnar nerves. There are no open wounds. There are no signs of infection. There is no evidence of lymphedema or lymphatic streaking. The patient has supple compartments of the left arm, forearm and hand.  Patient has 90 " degrees of flexion contracture across the metacarpal phalangeal joints to left ring and small finger with palpable Dupuytren's cording noted.  This is not passively correctable.  He has positive tabletop test.     Imaging/Test Results:  None today.     Assessment:  Left ring and small finger Dupuytren's MCP cording with contracture of about 90 degrees to the affected digits.     Plan:  Treatment options were reviewed including operative and nonoperative strategies.  He has strong preference to avoid surgical intervention.  He is very interested in moving forward with Xiaflex injections however.  Risk benefits and alternatives were discussed including risk of skin tear, continued contracture, and flexor tendon rupture.  He would like to target to do this sometime later in the fall or winter when he has some downtime from activities outdoors.  We discussed stopping Plavix prior to injection, for at least 5 days.  We will seek to approve 2 vials for injection to be performed in the office with manipulation done 2 days subsequently.  Patient was amenable to this.  All questions answered today's visit.    In a face to face encounter, I performed a history and physical examination, discussed pertinent diagnostic studies if indicated, and discussed diagnosis and management strategies with both the patient and the mid-level provider. I reviewed the mid-level's note and agree with the documented findings and plan of care.  Patient presents today for evaluation of postural change to the left hand involving ring finger and small finger.  He has palpable Dupuytren's cords to MCPs of the left ring finger and small finger with 90 degree flexion posture to both of those MCPs.  Positive tabletop test.  Palpable cords.  Treatment options were discussed including operative and nonoperative strategies.  We talked about collagenase and risks specific to collagenase including but not limited to skin tear and flexor tendon rupture.   Patient elects to proceed forth with collagenase injection with a target for administration in October.  Will seek approval through the insurance company and coordinate things.  He elects to forego surgical options.

## 2025-07-05 DIAGNOSIS — I25.10 ATHEROSCLEROSIS OF NATIVE CORONARY ARTERY OF NATIVE HEART WITHOUT ANGINA PECTORIS: ICD-10-CM

## 2025-07-05 DIAGNOSIS — I10 ESSENTIAL HYPERTENSION, BENIGN: ICD-10-CM

## 2025-07-09 RX ORDER — METOPROLOL SUCCINATE 25 MG/1
25 TABLET, EXTENDED RELEASE ORAL DAILY
Qty: 90 TABLET | Refills: 3 | Status: SHIPPED | OUTPATIENT
Start: 2025-07-09

## 2025-07-09 RX ORDER — RAMIPRIL 2.5 MG/1
2.5 CAPSULE ORAL DAILY
Qty: 90 CAPSULE | Refills: 3 | Status: SHIPPED | OUTPATIENT
Start: 2025-07-09

## 2025-07-16 ENCOUNTER — TELEPHONE (OUTPATIENT)
Dept: ORTHOPEDIC SURGERY | Facility: CLINIC | Age: 85
End: 2025-07-16
Payer: MEDICARE

## 2025-07-22 DIAGNOSIS — E78.2 MIXED HYPERLIPIDEMIA: ICD-10-CM

## 2025-07-22 DIAGNOSIS — M72.0 DUPUYTREN'S CONTRACTURE: Primary | ICD-10-CM

## 2025-07-23 RX ORDER — ATORVASTATIN CALCIUM 80 MG/1
80 TABLET, FILM COATED ORAL DAILY
Qty: 90 TABLET | Refills: 1 | Status: SHIPPED | OUTPATIENT
Start: 2025-07-23 | End: 2026-01-19

## 2025-07-29 RX ORDER — NEEDLES, SAFETY 22GX1 1/2"
NEEDLE, DISPOSABLE MISCELLANEOUS
Qty: 2 EACH | Refills: 0 | Status: SHIPPED | OUTPATIENT
Start: 2025-07-29

## 2025-08-18 ENCOUNTER — APPOINTMENT (OUTPATIENT)
Dept: CARDIOLOGY | Facility: CLINIC | Age: 85
End: 2025-08-18
Payer: MEDICARE

## 2025-08-25 ENCOUNTER — APPOINTMENT (OUTPATIENT)
Dept: CARDIOLOGY | Facility: CLINIC | Age: 85
End: 2025-08-25
Payer: MEDICARE

## 2025-08-25 VITALS
WEIGHT: 261 LBS | SYSTOLIC BLOOD PRESSURE: 124 MMHG | HEART RATE: 59 BPM | HEIGHT: 73 IN | BODY MASS INDEX: 34.59 KG/M2 | DIASTOLIC BLOOD PRESSURE: 84 MMHG

## 2025-08-25 DIAGNOSIS — I25.10 ATHEROSCLEROSIS OF NATIVE CORONARY ARTERY OF NATIVE HEART WITHOUT ANGINA PECTORIS: ICD-10-CM

## 2025-08-25 DIAGNOSIS — I25.5 ISCHEMIC CARDIOMYOPATHY: ICD-10-CM

## 2025-08-25 DIAGNOSIS — R06.02 SHORTNESS OF BREATH ON EXERTION: Primary | ICD-10-CM

## 2025-08-25 DIAGNOSIS — Z87.891 FORMER SMOKER: ICD-10-CM

## 2025-08-25 DIAGNOSIS — I95.1 ORTHOSTATIC HYPOTENSION: ICD-10-CM

## 2025-08-25 DIAGNOSIS — E78.2 MIXED HYPERLIPIDEMIA: ICD-10-CM

## 2025-08-25 DIAGNOSIS — I10 ESSENTIAL HYPERTENSION, BENIGN: ICD-10-CM

## 2025-08-25 PROCEDURE — 99213 OFFICE O/P EST LOW 20 MIN: CPT | Performed by: INTERNAL MEDICINE

## 2025-08-25 PROCEDURE — 1160F RVW MEDS BY RX/DR IN RCRD: CPT | Performed by: INTERNAL MEDICINE

## 2025-08-25 PROCEDURE — 1159F MED LIST DOCD IN RCRD: CPT | Performed by: INTERNAL MEDICINE

## 2025-08-25 PROCEDURE — 3074F SYST BP LT 130 MM HG: CPT | Performed by: INTERNAL MEDICINE

## 2025-08-25 PROCEDURE — G2211 COMPLEX E/M VISIT ADD ON: HCPCS | Performed by: INTERNAL MEDICINE

## 2025-08-25 PROCEDURE — 3079F DIAST BP 80-89 MM HG: CPT | Performed by: INTERNAL MEDICINE

## 2025-08-25 ASSESSMENT — ENCOUNTER SYMPTOMS
LIGHT-HEADEDNESS: 1
SHORTNESS OF BREATH: 1
ARTHRALGIAS: 1
CONSTITUTIONAL NEGATIVE: 1
CARDIOVASCULAR NEGATIVE: 1

## 2025-09-29 ENCOUNTER — APPOINTMENT (OUTPATIENT)
Dept: PRIMARY CARE | Facility: CLINIC | Age: 85
End: 2025-09-29
Payer: MEDICARE

## 2026-05-18 ENCOUNTER — APPOINTMENT (OUTPATIENT)
Dept: CARDIOLOGY | Facility: CLINIC | Age: 86
End: 2026-05-18
Payer: MEDICARE